# Patient Record
Sex: MALE | Race: WHITE | NOT HISPANIC OR LATINO | Employment: OTHER | URBAN - NONMETROPOLITAN AREA
[De-identification: names, ages, dates, MRNs, and addresses within clinical notes are randomized per-mention and may not be internally consistent; named-entity substitution may affect disease eponyms.]

---

## 2021-02-08 DIAGNOSIS — E78.5 HYPERLIPIDEMIA, UNSPECIFIED HYPERLIPIDEMIA TYPE: Primary | ICD-10-CM

## 2021-02-08 RX ORDER — EVOLOCUMAB 140 MG/ML
140 INJECTION, SOLUTION SUBCUTANEOUS
Qty: 6 ML | Refills: 1 | Status: SHIPPED | OUTPATIENT
Start: 2021-02-08 | End: 2022-07-21

## 2021-02-08 NOTE — TELEPHONE ENCOUNTER
Dr. Duke:     Last Office Visit: 12/09/2019  Last Labs: 10/21/2020  Next Lab Visit: -   Next Office Visit: -      :  Please book patient for a follow up appointment for labs and office visit.

## 2022-05-09 RX ORDER — PIOGLITAZONEHYDROCHLORIDE 45 MG/1
TABLET ORAL
Qty: 90 TABLET | Refills: 0 | Status: SHIPPED | OUTPATIENT
Start: 2022-05-09 | End: 2022-05-11 | Stop reason: SDUPTHER

## 2022-05-12 RX ORDER — PIOGLITAZONEHYDROCHLORIDE 45 MG/1
45 TABLET ORAL DAILY
Qty: 90 TABLET | Refills: 0 | Status: SHIPPED | OUTPATIENT
Start: 2022-05-12 | End: 2022-06-28

## 2022-05-20 RX ORDER — PIOGLITAZONEHYDROCHLORIDE 45 MG/1
TABLET ORAL
Qty: 90 TABLET | Refills: 0 | Status: SHIPPED | OUTPATIENT
Start: 2022-05-20 | End: 2022-06-28 | Stop reason: SDUPTHER

## 2022-06-03 RX ORDER — PIOGLITAZONEHYDROCHLORIDE 45 MG/1
TABLET ORAL
Qty: 90 TABLET | OUTPATIENT
Start: 2022-06-03

## 2022-06-28 ENCOUNTER — OFFICE VISIT (OUTPATIENT)
Dept: FAMILY MEDICINE CLINIC | Facility: CLINIC | Age: 77
End: 2022-06-28

## 2022-06-28 VITALS
BODY MASS INDEX: 34.37 KG/M2 | SYSTOLIC BLOOD PRESSURE: 132 MMHG | HEIGHT: 70 IN | DIASTOLIC BLOOD PRESSURE: 86 MMHG | OXYGEN SATURATION: 98 % | HEART RATE: 65 BPM | WEIGHT: 240.1 LBS

## 2022-06-28 DIAGNOSIS — E55.9 VITAMIN D DEFICIENCY: ICD-10-CM

## 2022-06-28 DIAGNOSIS — R80.9 TYPE 2 DIABETES MELLITUS WITH MICROALBUMINURIA, WITHOUT LONG-TERM CURRENT USE OF INSULIN: Primary | ICD-10-CM

## 2022-06-28 DIAGNOSIS — I10 HYPERTENSION, ESSENTIAL: ICD-10-CM

## 2022-06-28 DIAGNOSIS — E11.29 TYPE 2 DIABETES MELLITUS WITH MICROALBUMINURIA, WITHOUT LONG-TERM CURRENT USE OF INSULIN: Primary | ICD-10-CM

## 2022-06-28 DIAGNOSIS — E78.2 HYPERLIPIDEMIA, MIXED: ICD-10-CM

## 2022-06-28 PROCEDURE — 99214 OFFICE O/P EST MOD 30 MIN: CPT | Performed by: FAMILY MEDICINE

## 2022-06-28 PROCEDURE — 36415 COLL VENOUS BLD VENIPUNCTURE: CPT | Performed by: FAMILY MEDICINE

## 2022-06-28 RX ORDER — GLIPIZIDE 10 MG/1
10 TABLET ORAL
Qty: 180 TABLET | Refills: 1 | Status: SHIPPED | OUTPATIENT
Start: 2022-06-28 | End: 2022-11-01 | Stop reason: SDUPTHER

## 2022-06-28 RX ORDER — PIOGLITAZONEHYDROCHLORIDE 45 MG/1
45 TABLET ORAL DAILY
Qty: 90 TABLET | Refills: 1 | Status: SHIPPED | OUTPATIENT
Start: 2022-06-28 | End: 2022-11-01 | Stop reason: SDUPTHER

## 2022-06-28 RX ORDER — LOSARTAN POTASSIUM AND HYDROCHLOROTHIAZIDE 12.5; 1 MG/1; MG/1
1 TABLET ORAL DAILY
Qty: 90 TABLET | Refills: 1 | Status: SHIPPED | OUTPATIENT
Start: 2022-06-28 | End: 2022-11-01

## 2022-06-28 RX ORDER — AMLODIPINE BESYLATE 5 MG/1
5 TABLET ORAL DAILY
Qty: 90 TABLET | Refills: 1 | Status: SHIPPED | OUTPATIENT
Start: 2022-06-28 | End: 2022-11-01

## 2022-06-28 NOTE — PROGRESS NOTES
"Chief Complaint  Diabetes and Hypertension    Subjective    History of Present Illness:  Paul Shook is a 76 y.o. male who presents today for follow-up visit medication refills.  He has been doing well since his last visit together.  He has stopped both of Proscar and Flomax per urology after UroLift procedure.        [employer] Chart Review:  He does continue to follow with Urology given history of PSA elevation. They continue to monitor his PSA for now and consider biopsies only if his PSA goes higher into the 9-10 range.Past-due for followup colonoscopy given polyp on his 2012 colonoscopy. Urine microalb pos 10/19 -continues ARB given cough w/ ACE.  Eye exam uptodate with Dr Valdivia.     Stopped pravachol, atorvastatin, crestor and livalo given myalgias and muscle aches. On RepathaFollowup ongoing with Dr. Jade for CKD. [id number] 10/2020.  Completed covid19 series in 2021 Declines other vaccination update. Declines flu    Objective   Vital Signs:   /86 (BP Location: Left arm, Patient Position: Sitting, Cuff Size: Large Adult)   Pulse 65   Ht 177.8 cm (70\")   Wt 109 kg (240 lb 1.6 oz)   SpO2 98%   BMI 34.45 kg/m²     Review of Systems   Constitutional: Negative for appetite change, chills and fever.   HENT: Negative for hearing loss.    Eyes: Negative for blurred vision.   Respiratory: Negative for chest tightness.    Cardiovascular: Negative for chest pain.   Gastrointestinal: Negative for abdominal pain.   Musculoskeletal: Positive for arthralgias. Negative for gait problem and joint swelling.   Skin: Negative for rash.   Psychiatric/Behavioral: Negative for depressed mood.       Past History:  Medical History: has a past medical history of Acute maxillary sinusitis, Anemia, Anemia, chronic disease, Anxiety, Appendicitis, Benign essential hypertension, Blind left eye, Blood chemistry abnormality, BMI 34.0-34.9,adult, Cancer of skin of external cheek, Chronic kidney disease, stage 3a (HCC), Colon " polyps, Elevated lipids, Elevated PSA, Eye injury, Knee injury, Mixed hyperlipidemia, Renal disease, Type 2 diabetes mellitus (HCC), and Vitamin D deficiency.   Surgical History: has a past surgical history that includes Skin graft (Right, 2011); Knee surgery (Left); Appendectomy; Esophagogastroduodenoscopy (12/12/2012); and Colonoscopy (12/12/2012).   Family History: family history includes Cancer in his brother; Diabetes in his maternal grandfather and maternal grandmother; Diabetes type II in his mother; Heart disease in an other family member; Hypertension in an other family member; Lung cancer in his sister.   Social History: reports that he has never smoked. He has never used smokeless tobacco. He reports previous alcohol use. He reports that he does not use drugs.      Current Outpatient Medications:   •  amLODIPine (NORVASC) 5 MG tablet, Take 1 tablet by mouth Daily., Disp: 90 tablet, Rfl: 1  •  aspirin 81 MG EC tablet, Take 81 mg by mouth Daily., Disp: , Rfl:   •  Cholecalciferol 100 MCG (4000 UT) capsule, Take 1 tablet by mouth Daily., Disp: , Rfl:   •  Diclofenac Sodium (VOLTAREN) 1 % gel gel, Apply 4 g topically to the appropriate area as directed 4 (Four) Times a Day., Disp: , Rfl:   •  Evolocumab (Repatha SureClick) solution auto-injector SureClick injection, Inject 1 mL under the skin into the appropriate area as directed Every 14 (Fourteen) Days., Disp: 6 mL, Rfl: 1  •  glipizide (GLUCOTROL) 10 MG tablet, Take 1 tablet by mouth 2 (Two) Times a Day Before Meals., Disp: 180 tablet, Rfl: 1  •  glucose blood test strip, 1 each by Other route Daily. Use as instructed  frestyle lite strips, Disp: , Rfl:   •  pioglitazone (ACTOS) 45 MG tablet, Take 1 tablet by mouth Daily., Disp: 90 tablet, Rfl: 1  •  vitamin B-12 (CYANOCOBALAMIN) 1000 MCG tablet, Take 1,000 mcg by mouth Daily., Disp: , Rfl:   •  losartan-hydrochlorothiazide (HYZAAR) 100-12.5 MG per tablet, Take 1 tablet by mouth Daily., Disp: 90 tablet,  Rfl: 1    Allergies: Lisinopril    Physical Exam  Constitutional:       Appearance: Normal appearance.   HENT:      Head: Normocephalic.      Right Ear: External ear normal.      Left Ear: External ear normal.      Nose: Nose normal.   Eyes:      Pupils: Pupils are equal, round, and reactive to light.      Comments: Chronic right eye corneal cloudiness.   Cardiovascular:      Rate and Rhythm: Normal rate and regular rhythm.      Heart sounds: Normal heart sounds.   Pulmonary:      Effort: Pulmonary effort is normal.      Breath sounds: Normal breath sounds.   Musculoskeletal:         General: Normal range of motion.      Cervical back: Normal range of motion.      Right lower leg: No edema.      Left lower leg: No edema.   Skin:     General: Skin is warm and dry.   Neurological:      General: No focal deficit present.      Mental Status: He is alert.   Psychiatric:         Mood and Affect: Mood normal.         Behavior: Behavior normal.         Thought Content: Thought content normal.          Result Review                   Assessment and Plan  Diagnoses and all orders for this visit:    1. Type 2 diabetes mellitus with microalbuminuria, without long-term current use of insulin ([facility]) (Primary)  Assessment & Plan:  Diabetes is improving with treatment.   Continue current treatment regimen.  Diabetes will be reassessed 4 months.    Orders:  -     glipizide (GLUCOTROL) 10 MG tablet; Take 1 tablet by mouth 2 (Two) Times a Day Before Meals.  Dispense: 180 tablet; Refill: 1  -     pioglitazone (ACTOS) 45 MG tablet; Take 1 tablet by mouth Daily.  Dispense: 90 tablet; Refill: 1  -     Comprehensive Metabolic Panel; Future  -     Lipid Panel; Future  -     Hemoglobin A1c; Future  -     Comprehensive Metabolic Panel  -     Lipid Panel  -     Hemoglobin A1c    2. Hypertension, essential  Assessment & Plan:  Hypertension is improving with treatment.  Continue current treatment regimen.  Blood pressure will be reassessed at the  next regular appointment.    Orders:  -     amLODIPine (NORVASC) 5 MG tablet; Take 1 tablet by mouth Daily.  Dispense: 90 tablet; Refill: 1  -     losartan-hydrochlorothiazide (HYZAAR) 100-12.5 MG per tablet; Take 1 tablet by mouth Daily.  Dispense: 90 tablet; Refill: 1    3. Hyperlipidemia, mixed  Assessment & Plan:  Lipid abnormalities are improving with treatment.  Pharmacotherapy as ordered.  Lipids will be reassessed In 4 months.      4. Vitamin D deficiency  Assessment & Plan:  Continue vitamin D    Orders:  -     Vitamin D 25 Hydroxy; Future  -     Vitamin D 25 Hydroxy      BMI is >= 30 and <35. (Class 1 Obesity). The following options were offered after discussion;: exercise counseling/recommendations and nutrition counseling/recommendations          Follow Up  Return in about 4 months (around 10/28/2022) [facility].    Patient was given instructions and counseling regarding his condition or for health maintenance advice. Please see specific information pulled into the AVS if appropriate.     Dwayne Silva MD

## 2022-06-28 NOTE — ASSESSMENT & PLAN NOTE
Diabetes is improving with treatment.   Continue current treatment regimen.  Diabetes will be reassessed 4 months.

## 2022-06-28 NOTE — ASSESSMENT & PLAN NOTE
Patient: Lawson Tineo Date: 2020   : 1965 Attending: Placido Carney MD   55 Lipid abnormalities are improving with treatment.  Pharmacotherapy as ordered.  Lipids will be reassessed In 4 months.                       Alem Velásquez MD, SHAR[facility] RODRIGUEZ

## 2022-06-29 LAB
ALBUMIN SERPL-MCNC: 4.2 G/DL (ref 3.7–4.7)
ALBUMIN/GLOB SERPL: 1.2 {RATIO} (ref 1.2–2.2)
ALP SERPL-CCNC: 69 IU/L (ref 44–121)
ALT SERPL-CCNC: 11 IU/L (ref 0–44)
AST SERPL-CCNC: 16 IU/L (ref 0–40)
BILIRUB SERPL-MCNC: 0.3 MG/DL (ref 0–1.2)
BUN SERPL-MCNC: 33 MG/DL (ref 8–27)
BUN/CREAT SERPL: 19 (ref 10–24)
CALCIUM SERPL-MCNC: 9.1 MG/DL (ref 8.6–10.2)
CHLORIDE SERPL-SCNC: 100 MMOL/L (ref 96–106)
CHOLEST SERPL-MCNC: 102 MG/DL (ref 100–199)
CO2 SERPL-SCNC: 20 MMOL/L (ref 20–29)
CREAT SERPL-MCNC: 1.75 MG/DL (ref 0.76–1.27)
EGFRCR SERPLBLD CKD-EPI 2021: 40 ML/MIN/1.73
GLOBULIN SER CALC-MCNC: 3.4 G/DL (ref 1.5–4.5)
GLUCOSE SERPL-MCNC: 144 MG/DL (ref 65–99)
HBA1C MFR BLD: 7.1 % (ref 4.8–5.6)
HDLC SERPL-MCNC: 27 MG/DL
LDLC SERPL CALC-MCNC: 43 MG/DL (ref 0–99)
POTASSIUM SERPL-SCNC: 4.7 MMOL/L (ref 3.5–5.2)
PROT SERPL-MCNC: 7.6 G/DL (ref 6–8.5)
SODIUM SERPL-SCNC: 139 MMOL/L (ref 134–144)
TRIGL SERPL-MCNC: 195 MG/DL (ref 0–149)
VLDLC SERPL CALC-MCNC: 32 MG/DL (ref 5–40)

## 2022-06-30 LAB — 25(OH)D3+25(OH)D2 SERPL-MCNC: 50.1 NG/ML (ref 30–100)

## 2022-07-21 DIAGNOSIS — E78.5 HYPERLIPIDEMIA, UNSPECIFIED HYPERLIPIDEMIA TYPE: ICD-10-CM

## 2022-07-21 RX ORDER — EVOLOCUMAB 140 MG/ML
INJECTION, SOLUTION SUBCUTANEOUS
Qty: 2 ML | Refills: 2 | Status: SHIPPED | OUTPATIENT
Start: 2022-07-21 | End: 2022-09-21 | Stop reason: SDUPTHER

## 2022-09-14 ENCOUNTER — PATIENT MESSAGE (OUTPATIENT)
Dept: FAMILY MEDICINE CLINIC | Facility: CLINIC | Age: 77
End: 2022-09-14

## 2022-09-14 NOTE — TELEPHONE ENCOUNTER
From: Paul Shook  To: Dwayne Silva MD  Sent: 9/14/2022 11:56 AM EDT  Subject: Repatha shot    I tried to inject the shot on 9/13/2022 after being out of refrigerator for 1/2 hour. I tried several times and it would not click for the medicine release. I have call the pharmacy and was told to call Repatha help line..   I was told to call his doctor and ask if it would matter if he missed the shot or if you had a sample you could give me. Help desk said if I didn't get a shot before 7 days to go back to regular schedule.  Let me know if this is correct.  Thank you

## 2022-09-21 DIAGNOSIS — E78.5 HYPERLIPIDEMIA, UNSPECIFIED HYPERLIPIDEMIA TYPE: ICD-10-CM

## 2022-09-21 RX ORDER — EVOLOCUMAB 140 MG/ML
140 INJECTION, SOLUTION SUBCUTANEOUS
Qty: 2 ML | Refills: 2 | Status: SHIPPED | OUTPATIENT
Start: 2022-09-21 | End: 2022-11-01 | Stop reason: SDUPTHER

## 2022-09-21 NOTE — TELEPHONE ENCOUNTER
Caller: [facility] [place] - [address] - [phone]  - [phone] FX    Relationship: Pharmacy    Best call back number: [phone]    Requested Prescriptions:   Requested Prescriptions     Pending Prescriptions Disp Refills   • Evolocumab (Repatha SureClick) solution auto-injector SureClick injection 2 mL 2        Pharmacy where request should be sent: [facility] [place] - [address] - [phone]  - [phone] FX     Additional details provided by patient: [facility] NEEDS AUTHORIZATION AND A PRESCRIPTION FOR 1 PEN AS HE HAD A MISFIRE. PLEASE ADVISE      Does the patient have less than a 3 day supply:  [x] Yes  [] No    Syed Minor Rep   09/21/22 09:15 EDT

## 2022-11-01 ENCOUNTER — OFFICE VISIT (OUTPATIENT)
Dept: FAMILY MEDICINE CLINIC | Facility: CLINIC | Age: 77
End: 2022-11-01

## 2022-11-01 VITALS
WEIGHT: 241 LBS | DIASTOLIC BLOOD PRESSURE: 80 MMHG | SYSTOLIC BLOOD PRESSURE: 140 MMHG | BODY MASS INDEX: 34.5 KG/M2 | HEIGHT: 70 IN | HEART RATE: 70 BPM | OXYGEN SATURATION: 99 %

## 2022-11-01 DIAGNOSIS — E55.9 VITAMIN D DEFICIENCY: Chronic | ICD-10-CM

## 2022-11-01 DIAGNOSIS — E53.8 VITAMIN B12 DEFICIENCY: ICD-10-CM

## 2022-11-01 DIAGNOSIS — E78.2 HYPERLIPIDEMIA, MIXED: Chronic | ICD-10-CM

## 2022-11-01 DIAGNOSIS — R80.9 TYPE 2 DIABETES MELLITUS WITH MICROALBUMINURIA, WITHOUT LONG-TERM CURRENT USE OF INSULIN: Chronic | ICD-10-CM

## 2022-11-01 DIAGNOSIS — E11.22 TYPE 2 DIABETES MELLITUS WITH STAGE 3B CHRONIC KIDNEY DISEASE, WITHOUT LONG-TERM CURRENT USE OF INSULIN: ICD-10-CM

## 2022-11-01 DIAGNOSIS — E11.29 TYPE 2 DIABETES MELLITUS WITH MICROALBUMINURIA, WITHOUT LONG-TERM CURRENT USE OF INSULIN: Chronic | ICD-10-CM

## 2022-11-01 DIAGNOSIS — E61.1 IRON DEFICIENCY: ICD-10-CM

## 2022-11-01 DIAGNOSIS — I10 HYPERTENSION, ESSENTIAL: Chronic | ICD-10-CM

## 2022-11-01 DIAGNOSIS — N18.32 TYPE 2 DIABETES MELLITUS WITH STAGE 3B CHRONIC KIDNEY DISEASE, WITHOUT LONG-TERM CURRENT USE OF INSULIN: ICD-10-CM

## 2022-11-01 DIAGNOSIS — Z00.00 GENERAL MEDICAL EXAM: Primary | ICD-10-CM

## 2022-11-01 PROCEDURE — 96160 PT-FOCUSED HLTH RISK ASSMT: CPT | Performed by: FAMILY MEDICINE

## 2022-11-01 PROCEDURE — 36415 COLL VENOUS BLD VENIPUNCTURE: CPT | Performed by: FAMILY MEDICINE

## 2022-11-01 PROCEDURE — 1170F FXNL STATUS ASSESSED: CPT | Performed by: FAMILY MEDICINE

## 2022-11-01 PROCEDURE — G0439 PPPS, SUBSEQ VISIT: HCPCS | Performed by: FAMILY MEDICINE

## 2022-11-01 PROCEDURE — 1159F MED LIST DOCD IN RCRD: CPT | Performed by: FAMILY MEDICINE

## 2022-11-01 PROCEDURE — 99213 OFFICE O/P EST LOW 20 MIN: CPT | Performed by: FAMILY MEDICINE

## 2022-11-01 RX ORDER — PIOGLITAZONEHYDROCHLORIDE 45 MG/1
45 TABLET ORAL DAILY
Qty: 90 TABLET | Refills: 1 | Status: SHIPPED | OUTPATIENT
Start: 2022-11-01 | End: 2023-03-01 | Stop reason: SDUPTHER

## 2022-11-01 RX ORDER — AMLODIPINE AND VALSARTAN 5; 320 MG/1; MG/1
1 TABLET ORAL DAILY
Qty: 90 TABLET | Refills: 1 | Status: SHIPPED | OUTPATIENT
Start: 2022-11-01 | End: 2023-03-01 | Stop reason: SDUPTHER

## 2022-11-01 RX ORDER — GLIPIZIDE 10 MG/1
10 TABLET ORAL
Qty: 180 TABLET | Refills: 1 | Status: SHIPPED | OUTPATIENT
Start: 2022-11-01 | End: 2023-03-01 | Stop reason: SDUPTHER

## 2022-11-01 RX ORDER — ASPIRIN 81 MG/1
81 TABLET ORAL DAILY
Qty: 90 TABLET | Refills: 1 | Status: SHIPPED | OUTPATIENT
Start: 2022-11-01 | End: 2023-03-01 | Stop reason: SDUPTHER

## 2022-11-01 RX ORDER — EVOLOCUMAB 140 MG/ML
140 INJECTION, SOLUTION SUBCUTANEOUS
Qty: 2 ML | Refills: 5 | Status: SHIPPED | OUTPATIENT
Start: 2022-11-01 | End: 2023-03-01 | Stop reason: SDUPTHER

## 2022-11-01 NOTE — ASSESSMENT & PLAN NOTE
Reviewed together health maintenance, screening options, and vaccination recommendations at his annual wellness visit today.    Declines hep C screening.    Declines vaccination update.    Unable to give urine sample for microalbumin.    Diabetic eye exam scheduled for December.    Encouraged advance directive.    Discussed blood pressure elevation and we will change from amlodipine with losartan HCTZ to Exforge.  He will continue to monitor blood pressure readings at home and notify us if his blood pressure is staying over 140/90.  Recheck at next visit in 3 to 4 months.

## 2022-11-01 NOTE — PROGRESS NOTES
QUICK REFERENCE INFORMATION:  The ABCs of the Annual Wellness Visit    Subsequent Medicare Wellness Visit    @[email or web address]@    HEALTH RISK ASSESSMENT    1945    Recent Hospitalizations:  No hospitalization(s) within the last year..        Current Medical Providers:  Patient Care Team:  Dwayne Silva MD as PCP - General (Family Medicine)        Smoking Status:  Social History     Tobacco Use   Smoking Status Never   Smokeless Tobacco Never       Alcohol Consumption:  Social History     Substance and Sexual Activity   Alcohol Use Not Currently       Depression Screen:   PHQ-2/PHQ-9 Depression Screening 11/1/2022   Little Interest or Pleasure in Doing Things 0-->not at all   Feeling Down, Depressed or Hopeless 0-->not at all   PHQ-9: Brief Depression Severity Measure Score 0       Health Habits and Functional and Cognitive Screening:  Functional & Cognitive Status 11/1/2022   Do you have difficulty preparing food and eating? No   Do you have difficulty bathing yourself, getting dressed or grooming yourself? No   Do you have difficulty using the toilet? No   Do you have difficulty moving around from place to place? Yes   Do you have trouble with steps or getting out of a bed or a chair? Yes   Current Diet Well Balanced Diet   Dental Exam Up to date   Eye Exam Up to date   Exercise (times per week) 0 times per week   Current Exercises Include No Regular Exercise   Do you need help using the phone?  No   Are you deaf or do you have serious difficulty hearing?  No   Do you need help with transportation? No   Do you need help shopping? No   Do you need help preparing meals?  No   Do you need help with housework?  No   Do you need help with laundry? No   Do you need help taking your medications? No   Do you need help managing money? No   Do you ever drive or ride in a car without wearing a seat belt? No   Have you felt unusual stress, anger or loneliness in the last month? No   Who do you live with? Sibling   If you  need help, do you have trouble finding someone available to you? No   Have you been bothered in the last four weeks by sexual problems? No   Do you have difficulty concentrating, remembering or making decisions? No       Fall Risk Screen:  ANUJ Fall Risk Assessment was completed, and patient is at MODERATE risk for falls. Assessment completed on:11/1/2022    ACE III MINI        Does the patient have evidence of cognitive impairment? No    Aspirin use counseling: Taking ASA appropriately as indicated    Recent Lab Results:  CMP:  Lab Results   Component Value Date    BUN 33 (H) 06/28/2022    CREATININE 1.75 (H) 06/28/2022    BCR 19 06/28/2022     06/28/2022    K 4.7 06/28/2022    CO2 20 06/28/2022    CALCIUM 9.1 06/28/2022    PROTENTOTREF 7.6 06/28/2022    ALBUMIN 4.2 06/28/2022    LABGLOBREF 3.4 06/28/2022    LABIL2 1.2 06/28/2022    BILITOT 0.3 06/28/2022    ALKPHOS 69 06/28/2022    AST 16 06/28/2022    ALT 11 06/28/2022     HbA1c:  Lab Results   Component Value Date    HGBA1C 7.1 (H) 06/28/2022     Microalbumin:  No results found for: MICROALBUR, POCMALB, POCCREAT  Lipid Panel  Lab Results   Component Value Date    TRIG 195 (H) 06/28/2022    HDL 27 (L) 06/28/2022    LDL 43 06/28/2022    AST 16 06/28/2022    ALT 11 06/28/2022       Visual Acuity:  No results found.    Age-appropriate Screening Schedule:  Refer to the list below for future screening recommendations based on patient's age, sex and/or medical conditions. Orders for these recommended tests are listed in the plan section. The patient has been provided with a written plan.    Health Maintenance   Topic Date Due   • URINE MICROALBUMIN  11/19/2022 (Originally 1945)   • DIABETIC EYE EXAM  12/20/2022 (Originally 2/8/2021)   • INFLUENZA VACCINE  03/31/2023 (Originally 8/1/2022)   • TDAP/TD VACCINES (1 - Tdap) 11/01/2023 (Originally 7/1/1964)   • HEMOGLOBIN A1C  12/28/2022   • LIPID PANEL  06/28/2023   • ZOSTER VACCINE  Discontinued         Subjective   History of Present Illness    Paul Shook is a 77 y.o. male who presents for a Subsequent Wellness Visit and physical exam.    He has been doing well since his last visit together.  His blood pressure remains above goal today.  He has been checking it at home with his readings in the 140s over 80s.    We did try going to 10 mg with amlodipine but he had a lot of swelling difficulties.  He is willing to make a change from his amlodipine and losartan HCTZ another medication.    He does continue to follow with Urology given history of PSA elevation. They continue to monitor his PSA for now and consider biopsies only if his PSA goes higher into the 9-10 range.    Past-due for followup colonoscopy given polyp on his 2012 colonoscopy.  He does not want a follow-up colonoscopy at this time or Cologuard.    Urine microalb pos 10/19 -continues ARB given cough w/ ACE.   Unable to give urine sample for microalbumin today.    Eye exam uptodate with Dr Valdivia.  Next appointment is December 2022.     Stopped pravachol, atorvastatin, crestor and livalo given myalgias and muscle aches. On Repatha    Followup ongoing with Dr. Jade for CKD.     We discussed vaccination update today including influenza, Shingrix, Tdap, COVID booster.  He voiced understanding but declines vaccination update.    Declines hep C screening.    Encourage consideration for advanced directive.    He does have a history of anemia related to his chronic kidney disease with hemoglobin in the 9-10 range.  He is on an over-the-counter iron supplement along with B12.  No bleeding problems       CHRONIC CONDITIONS    The following portions of the patient's history were reviewed and updated as appropriate: allergies, current medications, past family history, past medical history, past social history, past surgical history and problem list.    Outpatient Medications Prior to Visit   Medication Sig Dispense Refill   • Cholecalciferol 100 MCG  (4000 UT) capsule Take 1 tablet by mouth Daily.     • Diclofenac Sodium (VOLTAREN) 1 % gel gel Apply 4 g topically to the appropriate area as directed 4 (Four) Times a Day.     • glucose blood test strip 1 each by Other route Daily. Use as instructed    frestyle lite strips     • vitamin B-12 (CYANOCOBALAMIN) 1000 MCG tablet Take 1,000 mcg by mouth Daily.     • amLODIPine (NORVASC) 5 MG tablet Take 1 tablet by mouth Daily. 90 tablet 1   • aspirin 81 MG EC tablet Take 81 mg by mouth Daily.     • Evolocumab (Repatha SureClick) solution auto-injector SureClick injection Inject 1 mL under the skin into the appropriate area as directed Every 14 (Fourteen) Days. 2 mL 2   • glipizide (GLUCOTROL) 10 MG tablet Take 1 tablet by mouth 2 (Two) Times a Day Before Meals. 180 tablet 1   • losartan-hydrochlorothiazide (HYZAAR) 100-12.5 MG per tablet Take 1 tablet by mouth Daily. 90 tablet 1   • pioglitazone (ACTOS) 45 MG tablet Take 1 tablet by mouth Daily. 90 tablet 1     No facility-administered medications prior to visit.       Patient Active Problem List   Diagnosis   • Type 2 diabetes mellitus with microalbuminuria, without long-term current use of insulin (HCC)   • Hypertension, essential   • Hyperlipidemia, mixed   • Vitamin D deficiency   • Type 2 diabetes mellitus with stage 3b chronic kidney disease, without long-term current use of insulin (HCC)   • Vitamin B12 deficiency   • Iron deficiency   • General medical exam       Advance Care Planning:  ACP discussion was held with the patient during this visit. Patient does not have an advance directive, information provided.    Identification of Risk Factors:  Risk factors include: Advance Directive Discussion  Cardiovascular risk  Fall Risk  Immunizations Discussed/Encouraged (specific immunizations; Tdap, Influenza, Prevnar 20 (Pneumococcal 20-valent conjugate), Shingrix and COVID19 ).    Review of Systems   Constitutional: Negative for appetite change, chills, fever and  "unexpected weight change.   HENT: Negative for hearing loss.    Eyes: Negative for visual disturbance.   Respiratory: Negative for chest tightness, shortness of breath and wheezing.    Cardiovascular: Negative for chest pain, palpitations and leg swelling.   Gastrointestinal: Negative for abdominal pain.   Musculoskeletal: Positive for arthralgias, back pain and gait problem.        Using cane for support. No falls   Skin: Negative for rash.   Neurological: Negative for dizziness and headaches.   Psychiatric/Behavioral: Negative for agitation and confusion. The patient is not nervous/anxious.        Compared to one year ago, the patient feels his physical health is the same.  Compared to one year ago, the patient feels his mental health is the same.    Objective     Physical Exam  Constitutional:       Appearance: He is obese.   HENT:      Head: Normocephalic.      Right Ear: External ear normal.      Left Ear: External ear normal.      Nose: Nose normal.   Eyes:      Pupils: Pupils are equal, round, and reactive to light.   Cardiovascular:      Rate and Rhythm: Normal rate and regular rhythm.      Heart sounds: Normal heart sounds.   Pulmonary:      Effort: Pulmonary effort is normal.      Breath sounds: Normal breath sounds.   Musculoskeletal:      Cervical back: Normal range of motion.      Comments: Using cane for ambulatory support   Skin:     General: Skin is warm and dry.   Neurological:      General: No focal deficit present.      Mental Status: He is alert.   Psychiatric:         Mood and Affect: Mood normal.         Behavior: Behavior normal.         Thought Content: Thought content normal.          Procedures     Vitals:    11/01/22 0748   BP: 140/80   BP Location: Left arm   Patient Position: Sitting   Cuff Size: Adult   Pulse: 70   SpO2: 99%   Weight: 109 kg (241 lb)   Height: 177.8 cm (70\")       BMI is >= 30 and <35. (Class 1 Obesity). The following options were offered after discussion;: exercise " counseling/recommendations and nutrition counseling/recommendations      No results found for: WBC, HGB, HCT, MCV, PLT  Lab Results   Component Value Date    GLUCOSE 144 (H) 06/28/2022    BUN 33 (H) 06/28/2022    CREATININE 1.75 (H) 06/28/2022    BCR 19 06/28/2022    K 4.7 06/28/2022    CO2 20 06/28/2022    CALCIUM 9.1 06/28/2022    PROTENTOTREF 7.6 06/28/2022    ALBUMIN 4.2 06/28/2022    LABIL2 1.2 06/28/2022    AST 16 06/28/2022    ALT 11 06/28/2022     No results found for: TSH  No results found for: PSA  Lab Results   Component Value Date    CHLPL 102 06/28/2022    TRIG 195 (H) 06/28/2022    HDL 27 (L) 06/28/2022    LDL 43 06/28/2022       Assessment & Plan   Problem List Items Addressed This Visit        Cardiac and Vasculature    Hypertension, essential (Chronic)    Current Assessment & Plan     See above.  Change to Exforge.         Relevant Medications    amLODIPine-valsartan (Exforge) 5-320 MG per tablet    Other Relevant Orders    CBC Auto Differential    Comprehensive Metabolic Panel    Lipid Panel    TSH    T4, Free    Hyperlipidemia, mixed (Chronic)    Current Assessment & Plan     Lipid abnormalities are improving with treatment.  Pharmacotherapy as ordered.  Lipids will be reassessed in 3 months.         Relevant Medications    Evolocumab (Repatha SureClick) solution auto-injector SureClick injection    Other Relevant Orders    CBC Auto Differential    Comprehensive Metabolic Panel    Lipid Panel    TSH    T4, Free       Endocrine and Metabolic    Type 2 diabetes mellitus with microalbuminuria, without long-term current use of insulin (HCC) (Chronic)    Current Assessment & Plan     Diabetes is improving with treatment.   Continue current treatment regimen.  Diabetes will be reassessed in 3 months.         Relevant Medications    amLODIPine-valsartan (Exforge) 5-320 MG per tablet    aspirin 81 MG EC tablet    Evolocumab (Repatha SureClick) solution auto-injector SureClick injection    glipizide  (GLUCOTROL) 10 MG tablet    pioglitazone (ACTOS) 45 MG tablet    Other Relevant Orders    CBC Auto Differential    Comprehensive Metabolic Panel    Lipid Panel    Hemoglobin A1c    TSH    T4, Free    Vitamin B12    Vitamin D deficiency (Chronic)    Current Assessment & Plan     Reviewed normal vitamin D with last labs.  Continues with over-the-counter vitamin D supplement         Vitamin B12 deficiency (Chronic)    Relevant Orders    Vitamin B12    Folate       Health Encounters    General medical exam - Primary    Current Assessment & Plan     Reviewed together health maintenance, screening options, and vaccination recommendations at his annual wellness visit today.    Declines hep C screening.    Declines vaccination update.    Unable to give urine sample for microalbumin.    Diabetic eye exam scheduled for December.    Encouraged advance directive.    Discussed blood pressure elevation and we will change from amlodipine with losartan HCTZ to Exforge.  He will continue to monitor blood pressure readings at home and notify us if his blood pressure is staying over 140/90.  Recheck at next visit in 3 to 4 months.            Hematology and Neoplasia    Iron deficiency    Current Assessment & Plan     On iron over-the-counter.    Will check CBC with iron studies.         Relevant Orders    Ferritin    Iron Profile       Other    Type 2 diabetes mellitus with stage 3b chronic kidney disease, without long-term current use of insulin (HCC) (Chronic)    Current Assessment & Plan     Renal condition is improving with treatment.  Continue current treatment regimen.  Renal condition will be reassessed in 3 months.         Relevant Medications    amLODIPine-valsartan (Exforge) 5-320 MG per tablet    aspirin 81 MG EC tablet    Evolocumab (Repatha SureClick) solution auto-injector SureClick injection    glipizide (GLUCOTROL) 10 MG tablet    pioglitazone (ACTOS) 45 MG tablet    Other Relevant Orders    CBC Auto Differential     Comprehensive Metabolic Panel    Lipid Panel    Hemoglobin A1c    TSH    T4, Free     Patient Self-Management and Personalized Health Advice  The patient has been provided with information about: diet and exercise and preventive services including:   · Annual Wellness Visit (AWV)  · Influenza Vaccine and Administration  · Pneumococcal Vaccine and Administration.    Outpatient Encounter Medications as of 11/1/2022   Medication Sig Dispense Refill   • aspirin 81 MG EC tablet Take 1 tablet by mouth Daily. 90 tablet 1   • Cholecalciferol 100 MCG (4000 UT) capsule Take 1 tablet by mouth Daily.     • Diclofenac Sodium (VOLTAREN) 1 % gel gel Apply 4 g topically to the appropriate area as directed 4 (Four) Times a Day.     • Evolocumab (Repatha SureClick) solution auto-injector SureClick injection Inject 1 mL under the skin into the appropriate area as directed Every 14 (Fourteen) Days. 2 mL 5   • glipizide (GLUCOTROL) 10 MG tablet Take 1 tablet by mouth 2 (Two) Times a Day Before Meals. 180 tablet 1   • glucose blood test strip 1 each by Other route Daily. Use as instructed    frestyle lite strips     • pioglitazone (ACTOS) 45 MG tablet Take 1 tablet by mouth Daily. 90 tablet 1   • vitamin B-12 (CYANOCOBALAMIN) 1000 MCG tablet Take 1,000 mcg by mouth Daily.     • [DISCONTINUED] amLODIPine (NORVASC) 5 MG tablet Take 1 tablet by mouth Daily. 90 tablet 1   • [DISCONTINUED] aspirin 81 MG EC tablet Take 81 mg by mouth Daily.     • [DISCONTINUED] Evolocumab (Repatha SureClick) solution auto-injector SureClick injection Inject 1 mL under the skin into the appropriate area as directed Every 14 (Fourteen) Days. 2 mL 2   • [DISCONTINUED] glipizide (GLUCOTROL) 10 MG tablet Take 1 tablet by mouth 2 (Two) Times a Day Before Meals. 180 tablet 1   • [DISCONTINUED] losartan-hydrochlorothiazide (HYZAAR) 100-12.5 MG per tablet Take 1 tablet by mouth Daily. 90 tablet 1   • [DISCONTINUED] pioglitazone (ACTOS) 45 MG tablet Take 1 tablet by  mouth Daily. 90 tablet 1   • amLODIPine-valsartan (Exforge) 5-320 MG per tablet Take 1 tablet by mouth Daily. (replaces amlodipine AND LosartanHCTZ) 90 tablet 1     No facility-administered encounter medications on file as of 11/1/2022.       Reviewed use of high risk medication in the elderly: yes  Reviewed for potential of harmful drug interactions in the elderly: yes    Diagnoses and all orders for this visit:    1. General medical exam (Primary)  Assessment & Plan:  Reviewed together health maintenance, screening options, and vaccination recommendations at his annual wellness visit today.    Declines hep C screening.    Declines vaccination update.    Unable to give urine sample for microalbumin.    Diabetic eye exam scheduled for December.    Encouraged advance directive.    Discussed blood pressure elevation and we will change from amlodipine with losartan HCTZ to Exforge.  He will continue to monitor blood pressure readings at home and notify us if his blood pressure is staying over 140/90.  Recheck at next visit in 3 to 4 months.      2. Type 2 diabetes mellitus with microalbuminuria, without long-term current use of insulin ([facility])  Assessment & Plan:  Diabetes is improving with treatment.   Continue current treatment regimen.  Diabetes will be reassessed in 3 months.    Orders:  -     amLODIPine-valsartan (Exforge) 5-320 MG per tablet; Take 1 tablet by mouth Daily. (replaces amlodipine AND LosartanHCTZ)  Dispense: 90 tablet; Refill: 1  -     aspirin 81 MG EC tablet; Take 1 tablet by mouth Daily.  Dispense: 90 tablet; Refill: 1  -     Evolocumab (Repatha SureClick) solution auto-injector SureClick injection; Inject 1 mL under the skin into the appropriate area as directed Every 14 (Fourteen) Days.  Dispense: 2 mL; Refill: 5  -     glipizide (GLUCOTROL) 10 MG tablet; Take 1 tablet by mouth 2 (Two) Times a Day Before Meals.  Dispense: 180 tablet; Refill: 1  -     pioglitazone (ACTOS) 45 MG tablet; Take 1 tablet  by mouth Daily.  Dispense: 90 tablet; Refill: 1  -     CBC Auto Differential; Future  -     Comprehensive Metabolic Panel; Future  -     Lipid Panel; Future  -     Hemoglobin A1c; Future  -     TSH; Future  -     T4, Free; Future  -     Vitamin B12; Future    3. Hypertension, essential  Assessment & Plan:  See above.  Change to Exforge.    Orders:  -     amLODIPine-valsartan (Exforge) 5-320 MG per tablet; Take 1 tablet by mouth Daily. (replaces amlodipine AND LosartanHCTZ)  Dispense: 90 tablet; Refill: 1  -     CBC Auto Differential; Future  -     Comprehensive Metabolic Panel; Future  -     Lipid Panel; Future  -     TSH; Future  -     T4, Free; Future    4. Hyperlipidemia, mixed  Assessment & Plan:  Lipid abnormalities are improving with treatment.  Pharmacotherapy as ordered.  Lipids will be reassessed in 3 months.    Orders:  -     Evolocumab (Repatha SureClick) solution auto-injector SureClick injection; Inject 1 mL under the skin into the appropriate area as directed Every 14 (Fourteen) Days.  Dispense: 2 mL; Refill: 5  -     CBC Auto Differential; Future  -     Comprehensive Metabolic Panel; Future  -     Lipid Panel; Future  -     TSH; Future  -     T4, Free; Future    5. Vitamin D deficiency  Assessment & Plan:  Reviewed normal vitamin D with last labs.  Continues with over-the-counter vitamin D supplement      6. Type 2 diabetes mellitus with stage 3b chronic kidney disease, without long-term current use of insulin ([facility])  Assessment & Plan:  Renal condition is improving with treatment.  Continue current treatment regimen.  Renal condition will be reassessed in 3 months.    Orders:  -     amLODIPine-valsartan (Exforge) 5-320 MG per tablet; Take 1 tablet by mouth Daily. (replaces amlodipine AND LosartanHCTZ)  Dispense: 90 tablet; Refill: 1  -     aspirin 81 MG EC tablet; Take 1 tablet by mouth Daily.  Dispense: 90 tablet; Refill: 1  -     Evolocumab (Repatha SureClick) solution auto-injector SureClick  injection; Inject 1 mL under the skin into the appropriate area as directed Every 14 (Fourteen) Days.  Dispense: 2 mL; Refill: 5  -     glipizide (GLUCOTROL) 10 MG tablet; Take 1 tablet by mouth 2 (Two) Times a Day Before Meals.  Dispense: 180 tablet; Refill: 1  -     pioglitazone (ACTOS) 45 MG tablet; Take 1 tablet by mouth Daily.  Dispense: 90 tablet; Refill: 1  -     CBC Auto Differential; Future  -     Comprehensive Metabolic Panel; Future  -     Lipid Panel; Future  -     Hemoglobin A1c; Future  -     TSH; Future  -     T4, Free; Future    7. Vitamin B12 deficiency  -     Vitamin B12; Future  -     Folate; Future    8. Iron deficiency  Assessment & Plan:  On iron over-the-counter.    Will check CBC with iron studies.    Orders:  -     Ferritin; Future  -     Iron Profile; Future      Follow Up:  Return in about 4 months (around 3/1/2023).     There are no Patient Instructions on file for this visit.    An After Visit Summary and PPPS with all of these plans were given to the patient.

## 2022-11-02 LAB
ALBUMIN SERPL-MCNC: 4.4 G/DL (ref 3.7–4.7)
ALBUMIN/GLOB SERPL: 1.5 {RATIO} (ref 1.2–2.2)
ALP SERPL-CCNC: 76 IU/L (ref 44–121)
ALT SERPL-CCNC: 8 IU/L (ref 0–44)
AST SERPL-CCNC: 13 IU/L (ref 0–40)
BASOPHILS # BLD AUTO: 0 X10E3/UL (ref 0–0.2)
BASOPHILS NFR BLD AUTO: 1 %
BILIRUB SERPL-MCNC: 0.2 MG/DL (ref 0–1.2)
BUN SERPL-MCNC: 22 MG/DL (ref 8–27)
BUN/CREAT SERPL: 13 (ref 10–24)
CALCIUM SERPL-MCNC: 9.4 MG/DL (ref 8.6–10.2)
CHLORIDE SERPL-SCNC: 101 MMOL/L (ref 96–106)
CHOLEST SERPL-MCNC: 108 MG/DL (ref 100–199)
CO2 SERPL-SCNC: 26 MMOL/L (ref 20–29)
CREAT SERPL-MCNC: 1.64 MG/DL (ref 0.76–1.27)
EGFRCR SERPLBLD CKD-EPI 2021: 43 ML/MIN/1.73
EOSINOPHIL # BLD AUTO: 0.2 X10E3/UL (ref 0–0.4)
EOSINOPHIL NFR BLD AUTO: 3 %
ERYTHROCYTE [DISTWIDTH] IN BLOOD BY AUTOMATED COUNT: 14.3 % (ref 11.6–15.4)
FERRITIN SERPL-MCNC: 169 NG/ML (ref 30–400)
FOLATE SERPL-MCNC: 6.1 NG/ML
GLOBULIN SER CALC-MCNC: 2.9 G/DL (ref 1.5–4.5)
GLUCOSE SERPL-MCNC: 113 MG/DL (ref 70–99)
HBA1C MFR BLD: 6.6 % (ref 4.8–5.6)
HCT VFR BLD AUTO: 33 % (ref 37.5–51)
HDLC SERPL-MCNC: 27 MG/DL
HGB BLD-MCNC: 10.2 G/DL (ref 13–17.7)
IMM GRANULOCYTES # BLD AUTO: 0 X10E3/UL (ref 0–0.1)
IMM GRANULOCYTES NFR BLD AUTO: 0 %
IRON SATN MFR SERPL: 19 % (ref 15–55)
IRON SERPL-MCNC: 59 UG/DL (ref 38–169)
LDLC SERPL CALC-MCNC: 52 MG/DL (ref 0–99)
LYMPHOCYTES # BLD AUTO: 1.1 X10E3/UL (ref 0.7–3.1)
LYMPHOCYTES NFR BLD AUTO: 21 %
MCH RBC QN AUTO: 29.7 PG (ref 26.6–33)
MCHC RBC AUTO-ENTMCNC: 30.9 G/DL (ref 31.5–35.7)
MCV RBC AUTO: 96 FL (ref 79–97)
MONOCYTES # BLD AUTO: 0.4 X10E3/UL (ref 0.1–0.9)
MONOCYTES NFR BLD AUTO: 8 %
NEUTROPHILS # BLD AUTO: 3.5 X10E3/UL (ref 1.4–7)
NEUTROPHILS NFR BLD AUTO: 67 %
PLATELET # BLD AUTO: 224 X10E3/UL (ref 150–450)
POTASSIUM SERPL-SCNC: 4.4 MMOL/L (ref 3.5–5.2)
PROT SERPL-MCNC: 7.3 G/DL (ref 6–8.5)
RBC # BLD AUTO: 3.43 X10E6/UL (ref 4.14–5.8)
SODIUM SERPL-SCNC: 139 MMOL/L (ref 134–144)
T4 FREE SERPL-MCNC: 1.49 NG/DL (ref 0.82–1.77)
TIBC SERPL-MCNC: 306 UG/DL (ref 250–450)
TRIGL SERPL-MCNC: 168 MG/DL (ref 0–149)
TSH SERPL DL<=0.005 MIU/L-ACNC: 1.93 UIU/ML (ref 0.45–4.5)
UIBC SERPL-MCNC: 247 UG/DL (ref 111–343)
VIT B12 SERPL-MCNC: 1441 PG/ML (ref 232–1245)
VLDLC SERPL CALC-MCNC: 29 MG/DL (ref 5–40)
WBC # BLD AUTO: 5.2 X10E3/UL (ref 3.4–10.8)

## 2022-12-14 ENCOUNTER — TELEPHONE (OUTPATIENT)
Dept: FAMILY MEDICINE CLINIC | Facility: CLINIC | Age: 77
End: 2022-12-14

## 2022-12-14 NOTE — TELEPHONE ENCOUNTER
Caller: ARNOLD-SON    Best call back number:    [phone]       What was the call regarding:  PATIENT'S (SON) ARNOLD WAS INFORMED OF THE MESSAGE AND STATED THAT HE HAS PATIENT AT THE ER DEPARTMENT NOW AT [facility]   Dianna Rondon MAMedical AssistantSigned  [phone]                   Tried to call patient and no answer unable to leave message, if patient calls back please tell him to go to urgent care or ER     HUB CAN READ

## 2022-12-14 NOTE — TELEPHONE ENCOUNTER
Tried to call patient and no answer unable to leave message, if patient calls back please tell him to go to urgent care or ER    HUB CAN READ

## 2022-12-14 NOTE — TELEPHONE ENCOUNTER
Caller: ARNOLD-SON    Relationship to patient:     Best call back number: [phone]    Chief complaint: FEVER,CAN BARELY BREATH, THROAT SWELLED UP, DIZZY  Patient directed to call 911 or go to their nearest emergency room.     Patient verbalized understanding: [x] Yes  [] No  If no, why?

## 2022-12-15 ENCOUNTER — TELEPHONE (OUTPATIENT)
Dept: FAMILY MEDICINE CLINIC | Facility: CLINIC | Age: 77
End: 2022-12-15

## 2022-12-15 NOTE — TELEPHONE ENCOUNTER
Called patients son and unable to leave voicemail if he calls back. Tylenol and motrin for body aches and mucinex for decongestion.    HUB CAN READ.

## 2022-12-15 NOTE — TELEPHONE ENCOUNTER
PT SON WAS INFORMED HE HAD COVID WOULD LIKE TO KNOW [facility] TO GET HIM CALL HIM AT [phone]

## 2023-01-18 ENCOUNTER — TELEPHONE (OUTPATIENT)
Dept: FAMILY MEDICINE CLINIC | Facility: CLINIC | Age: 78
End: 2023-01-18

## 2023-01-23 ENCOUNTER — OFFICE VISIT (OUTPATIENT)
Dept: FAMILY MEDICINE CLINIC | Facility: CLINIC | Age: 78
End: 2023-01-23
Payer: MEDICARE

## 2023-01-23 VITALS
WEIGHT: 247 LBS | BODY MASS INDEX: 35.36 KG/M2 | HEIGHT: 70 IN | SYSTOLIC BLOOD PRESSURE: 152 MMHG | DIASTOLIC BLOOD PRESSURE: 74 MMHG

## 2023-01-23 DIAGNOSIS — R60.0 EDEMA OF BOTH LEGS: ICD-10-CM

## 2023-01-23 DIAGNOSIS — I10 HYPERTENSION, ESSENTIAL: Primary | Chronic | ICD-10-CM

## 2023-01-23 PROCEDURE — 99213 OFFICE O/P EST LOW 20 MIN: CPT | Performed by: PHYSICIAN ASSISTANT

## 2023-01-23 RX ORDER — TORSEMIDE 20 MG/1
20 TABLET ORAL DAILY
Qty: 90 TABLET | Refills: 1 | Status: SHIPPED | OUTPATIENT
Start: 2023-01-23 | End: 2023-03-01 | Stop reason: SDUPTHER

## 2023-01-23 NOTE — ASSESSMENT & PLAN NOTE
Blood pressure is elevated this date however patient reports it has been running around 140/80 at home.  Discussed with patient that this is still above goal and he acknowledged understanding.  Patient prescribed torsemide and advised him to continue taking Exforge.  Advised him to continue to monitor his medication daily hopefully this will help resolve edema

## 2023-01-23 NOTE — PROGRESS NOTES
"Chief Complaint  Bilateral leg swelling x1 year    Subjective        Paul Shook presents to [facility]  History of Present Illness  Patient reports today secondary to having swelling in his lower legs and feet.  Patient reports that has been ongoing since November when his blood pressure medication was adjusted.  Patient denies any shortness of breath, orthopnea cough.  Patient reports feeling well      Objective   Vital Signs:  /74 (BP Location: Right arm, Patient Position: Sitting, Cuff Size: Large Adult)   Ht 177.8 cm (70\")   Wt 112 kg (247 lb)   BMI 35.44 kg/m²   Estimated body mass index is 35.44 kg/m² as calculated from the following:    Height as of this encounter: 177.8 cm (70\").    Weight as of this encounter: 112 kg (247 lb).             Physical Exam  Vitals and nursing note reviewed.   Constitutional:       General: He is not in acute distress.  HENT:      Head: Normocephalic.      Right Ear: Hearing normal.      Left Ear: Hearing normal.   Eyes:      Pupils: Pupils are equal, round, and reactive to light.   Cardiovascular:      Rate and Rhythm: Normal rate and regular rhythm.   Pulmonary:      Effort: Pulmonary effort is normal. No respiratory distress.   Musculoskeletal:      Right lower le+ Edema present.      Left lower le+ Edema present.   Skin:     General: Skin is warm and dry.   Neurological:      Mental Status: He is alert.   Psychiatric:         Mood and Affect: Mood normal.        Result Review :                   Assessment and Plan   Diagnoses and all orders for this visit:    1. Hypertension, essential (Primary)  Assessment & Plan:  Blood pressure is elevated this date however patient reports it has been running around 140/80 at home.  Discussed with patient that this is still above goal and he acknowledged understanding.  Patient prescribed torsemide and advised him to continue taking Exforge.  Advised him to continue to monitor his " medication daily hopefully this will help resolve edema    Orders:  -     torsemide (DEMADEX) 20 MG tablet; Take 1 tablet by mouth Daily. For blood pressure and swelling  Dispense: 90 tablet; Refill: 1    2. Edema of both legs  Assessment & Plan:  See plan above    Orders:  -     torsemide (DEMADEX) 20 MG tablet; Take 1 tablet by mouth Daily. For blood pressure and swelling  Dispense: 90 tablet; Refill: 1           Follow Up   No follow-ups on file.  Patient was given instructions and counseling regarding his condition or for health maintenance advice. Please see specific information pulled into the AVS if appropriate.

## 2023-03-01 ENCOUNTER — OFFICE VISIT (OUTPATIENT)
Dept: FAMILY MEDICINE CLINIC | Facility: CLINIC | Age: 78
End: 2023-03-01
Payer: MEDICARE

## 2023-03-01 VITALS
DIASTOLIC BLOOD PRESSURE: 84 MMHG | HEART RATE: 58 BPM | WEIGHT: 237 LBS | SYSTOLIC BLOOD PRESSURE: 112 MMHG | HEIGHT: 70 IN | BODY MASS INDEX: 33.93 KG/M2 | OXYGEN SATURATION: 97 %

## 2023-03-01 DIAGNOSIS — E78.2 HYPERLIPIDEMIA, MIXED: Chronic | ICD-10-CM

## 2023-03-01 DIAGNOSIS — I10 HYPERTENSION, ESSENTIAL: Chronic | ICD-10-CM

## 2023-03-01 DIAGNOSIS — N40.1 LOWER URINARY TRACT SYMPTOMS DUE TO BENIGN PROSTATIC HYPERPLASIA: ICD-10-CM

## 2023-03-01 DIAGNOSIS — R35.1 NOCTURIA: ICD-10-CM

## 2023-03-01 DIAGNOSIS — E11.29 TYPE 2 DIABETES MELLITUS WITH MICROALBUMINURIA, WITHOUT LONG-TERM CURRENT USE OF INSULIN: Primary | Chronic | ICD-10-CM

## 2023-03-01 DIAGNOSIS — D63.1 ANEMIA DUE TO STAGE 3B CHRONIC KIDNEY DISEASE: ICD-10-CM

## 2023-03-01 DIAGNOSIS — R60.0 EDEMA OF BOTH LEGS: ICD-10-CM

## 2023-03-01 DIAGNOSIS — N18.32 TYPE 2 DIABETES MELLITUS WITH STAGE 3B CHRONIC KIDNEY DISEASE, WITHOUT LONG-TERM CURRENT USE OF INSULIN: Chronic | ICD-10-CM

## 2023-03-01 DIAGNOSIS — R97.20 RAISED PROSTATE SPECIFIC ANTIGEN: ICD-10-CM

## 2023-03-01 DIAGNOSIS — Z23 NEED FOR PROPHYLACTIC VACCINATION AGAINST STREPTOCOCCUS PNEUMONIAE (PNEUMOCOCCUS): ICD-10-CM

## 2023-03-01 DIAGNOSIS — E11.22 TYPE 2 DIABETES MELLITUS WITH STAGE 3B CHRONIC KIDNEY DISEASE, WITHOUT LONG-TERM CURRENT USE OF INSULIN: Chronic | ICD-10-CM

## 2023-03-01 DIAGNOSIS — R80.9 TYPE 2 DIABETES MELLITUS WITH MICROALBUMINURIA, WITHOUT LONG-TERM CURRENT USE OF INSULIN: Primary | Chronic | ICD-10-CM

## 2023-03-01 DIAGNOSIS — E66.09 CLASS 1 OBESITY DUE TO EXCESS CALORIES WITH SERIOUS COMORBIDITY AND BODY MASS INDEX (BMI) OF 34.0 TO 34.9 IN ADULT: ICD-10-CM

## 2023-03-01 DIAGNOSIS — N18.32 ANEMIA DUE TO STAGE 3B CHRONIC KIDNEY DISEASE: ICD-10-CM

## 2023-03-01 PROBLEM — E61.1 IRON DEFICIENCY: Status: RESOLVED | Noted: 2022-11-01 | Resolved: 2023-03-01

## 2023-03-01 LAB — POC MICROALBUMIN URINE: 0

## 2023-03-01 PROCEDURE — 82044 UR ALBUMIN SEMIQUANTITATIVE: CPT | Performed by: FAMILY MEDICINE

## 2023-03-01 PROCEDURE — 36415 COLL VENOUS BLD VENIPUNCTURE: CPT | Performed by: FAMILY MEDICINE

## 2023-03-01 PROCEDURE — 90677 PCV20 VACCINE IM: CPT | Performed by: FAMILY MEDICINE

## 2023-03-01 PROCEDURE — 99214 OFFICE O/P EST MOD 30 MIN: CPT | Performed by: FAMILY MEDICINE

## 2023-03-01 PROCEDURE — G0009 ADMIN PNEUMOCOCCAL VACCINE: HCPCS | Performed by: FAMILY MEDICINE

## 2023-03-01 RX ORDER — GLIPIZIDE 10 MG/1
10 TABLET ORAL
Qty: 180 TABLET | Refills: 1 | Status: SHIPPED | OUTPATIENT
Start: 2023-03-01

## 2023-03-01 RX ORDER — ASPIRIN 81 MG/1
81 TABLET ORAL DAILY
Qty: 90 TABLET | Refills: 1 | Status: SHIPPED | OUTPATIENT
Start: 2023-03-01

## 2023-03-01 RX ORDER — AMLODIPINE AND VALSARTAN 5; 320 MG/1; MG/1
1 TABLET ORAL DAILY
Qty: 90 TABLET | Refills: 1 | Status: SHIPPED | OUTPATIENT
Start: 2023-03-01

## 2023-03-01 RX ORDER — PIOGLITAZONEHYDROCHLORIDE 45 MG/1
45 TABLET ORAL DAILY
Qty: 90 TABLET | Refills: 1 | Status: SHIPPED | OUTPATIENT
Start: 2023-03-01

## 2023-03-01 RX ORDER — TORSEMIDE 20 MG/1
20 TABLET ORAL DAILY
Qty: 90 TABLET | Refills: 1 | Status: SHIPPED | OUTPATIENT
Start: 2023-03-01

## 2023-03-01 RX ORDER — EVOLOCUMAB 140 MG/ML
140 INJECTION, SOLUTION SUBCUTANEOUS
Qty: 6 ML | Refills: 1 | Status: SHIPPED | OUTPATIENT
Start: 2023-03-01

## 2023-03-01 NOTE — ASSESSMENT & PLAN NOTE
Renal condition is improving with treatment.  Continue current treatment regimen.  Renal condition will be reassessed 4 months..    Awaiting recheck on renal function with labs.  Microalbumin negative today.  Nephrology follow-up ongoing given chronic kidney disease   normal

## 2023-03-01 NOTE — ASSESSMENT & PLAN NOTE
Awaiting recheck on anemia with CBC today.    Continues on vitamin supplementation over-the-counter and ongoing nephrology follow-up for anemia related to stage IIIb chronic kidney disease..

## 2023-03-01 NOTE — ASSESSMENT & PLAN NOTE
Continue Repatha injections.    Lipid abnormalities are improving with treatment.  Pharmacotherapy as ordered.  Lipids will be reassessed 4 months..

## 2023-03-01 NOTE — ASSESSMENT & PLAN NOTE
Diabetes is improving with treatment.   Continue current treatment regimen.  Diabetes will be reassessed 4 months..

## 2023-03-01 NOTE — PROGRESS NOTES
"Chief Complaint  Med Refill    Subjective    History of Present Illness:  Paul Shook is a 77 y.o. male who presents today for 4-month follow-up visit medication refills.    He has been doing well since our last visit in November for his Medicare annual wellness visit and physical exam.    He does continue to follow with nephrology given history of chronic kidney disease and urology given recurrent problems with prostate issues chronic PSA elevation.  Would like to establish care with [facility] urology.    His past PSA has been in the 9-10 range and they have discussed prostate biopsy only if his baseline was above this level.    Past-due for followup colonoscopy given polyp on his 2012 colonoscopy.  He does not want a follow-up colonoscopy at this time or Cologuard.     Urine microalb neg 3/2023.  His microalbumin has been positive in the past and he does continue on valsartan for blood pressure control given past cough with ACE inhibitor use.     Eye exam uptodate with Dr Valdivia last appointment done Dec 2022.    Stopped pravachol, atorvastatin, crestor and livalo given myalgias and muscle aches. On Repatha     Followup ongoing with Dr. Jade for CKD.      We discussed vaccination update today -he will consider his COVID booster at his pharmacy.  He is willing to get Prevnar 20 up-to-date.     Declines hep C screening.     Encourage consideration for advanced directive.     He does have a history of anemia related to his chronic kidney disease with hemoglobin in the 9-10 range.  He is on an over-the-counter iron supplement along with B12.  No bleeding problems     Chronic edema improved with torsemide.    Objective   Vital Signs:   /84 (BP Location: Left arm, Patient Position: Sitting, Cuff Size: Adult)   Pulse 58   Ht 177.8 cm (70\")   Wt 108 kg (237 lb)   SpO2 97%   BMI 34.01 kg/m²     Review of Systems   Constitutional: Negative for appetite change, chills and fever.   HENT: Negative for " hearing loss.    Eyes: Positive for blurred vision and visual disturbance.        Plans for cataract surgery due to symptomatic cataract.   Respiratory: Negative for chest tightness.    Cardiovascular: Positive for leg swelling. Negative for chest pain.   Gastrointestinal: Negative for abdominal pain.   Genitourinary: Positive for nocturia.        See HPI   Musculoskeletal: Positive for arthralgias and gait problem.        Using cane for ambulatory support   Skin: Negative for rash.   Psychiatric/Behavioral: Negative for depressed mood.       Past History:  Medical History: has a past medical history of Acute maxillary sinusitis, Anemia, Anemia, chronic disease, Anxiety, Appendicitis, Benign essential hypertension, Blind left eye, Blood chemistry abnormality, BMI 34.0-34.9,adult, Cancer of skin of external cheek, Chronic kidney disease, stage 3a (HCC), Colon polyps, Elevated lipids, Elevated PSA, Eye injury, Knee injury, Mixed hyperlipidemia, Renal disease, Type 2 diabetes mellitus (HCC), and Vitamin D deficiency.   Surgical History: has a past surgical history that includes Skin graft (Right, 2011); Knee surgery (Left); Appendectomy; Esophagogastroduodenoscopy (12/12/2012); and Colonoscopy (12/12/2012).   Family History: family history includes Cancer in his brother; Diabetes in his maternal grandfather and maternal grandmother; Diabetes type II in his mother; Heart disease in an other family member; Hypertension in an other family member; Lung cancer in his sister.   Social History: reports that he has never smoked. He has never used smokeless tobacco. He reports that he does not currently use alcohol. He reports that he does not use drugs.      Current Outpatient Medications:   •  amLODIPine-valsartan (Exforge) 5-320 MG per tablet, Take 1 tablet by mouth Daily. (replaces amlodipine AND LosartanHCTZ), Disp: 90 tablet, Rfl: 1  •  aspirin 81 MG EC tablet, Take 1 tablet by mouth Daily., Disp: 90 tablet, Rfl: 1  •   Cholecalciferol 100 MCG (4000 UT) capsule, Take 1 tablet by mouth Daily., Disp: , Rfl:   •  Evolocumab (Repatha SureClick) solution auto-injector SureClick injection, Inject 1 mL under the skin into the appropriate area as directed Every 14 (Fourteen) Days., Disp: 6 mL, Rfl: 1  •  glipizide (GLUCOTROL) 10 MG tablet, Take 1 tablet by mouth 2 (Two) Times a Day Before Meals., Disp: 180 tablet, Rfl: 1  •  glucose blood test strip, 1 each by Other route Daily. Use as instructed  frestyle lite strips, Disp: , Rfl:   •  pioglitazone (ACTOS) 45 MG tablet, Take 1 tablet by mouth Daily., Disp: 90 tablet, Rfl: 1  •  torsemide (DEMADEX) 20 MG tablet, Take 1 tablet by mouth Daily. For blood pressure and swelling, Disp: 90 tablet, Rfl: 1  •  vitamin B-12 (CYANOCOBALAMIN) 1000 MCG tablet, Take 1 tablet by mouth Daily., Disp: , Rfl:     Allergies: Lisinopril    Physical Exam  Constitutional:       Appearance: He is obese.   HENT:      Head: Normocephalic.      Right Ear: External ear normal.      Left Ear: External ear normal.      Nose: Nose normal.   Eyes:      Extraocular Movements: Extraocular movements intact.      Comments: Chronic corneal clouding unchanged.   Cardiovascular:      Rate and Rhythm: Normal rate and regular rhythm.      Heart sounds: Normal heart sounds.   Pulmonary:      Effort: Pulmonary effort is normal.      Breath sounds: Normal breath sounds.   Musculoskeletal:      Cervical back: Normal range of motion.      Comments: Swelling improved with torsemide.  Using cane for ambulatory support.   Skin:     General: Skin is warm and dry.   Neurological:      General: No focal deficit present.      Mental Status: He is alert.   Psychiatric:         Mood and Affect: Mood normal.         Behavior: Behavior normal.         Thought Content: Thought content normal.          Result Review                   Assessment and Plan  Diagnoses and all orders for this visit:    1. Type 2 diabetes mellitus with microalbuminuria,  without long-term current use of insulin (HCC) (Primary)  Assessment & Plan:  Diabetes is improving with treatment.   Continue current treatment regimen.  Diabetes will be reassessed 4 months..    Orders:  -     POCT microalbumin  -     Pneumococcal Conjugate Vaccine 20-Valent (PCV20)  -     amLODIPine-valsartan (Exforge) 5-320 MG per tablet; Take 1 tablet by mouth Daily. (replaces amlodipine AND LosartanHCTZ)  Dispense: 90 tablet; Refill: 1  -     aspirin 81 MG EC tablet; Take 1 tablet by mouth Daily.  Dispense: 90 tablet; Refill: 1  -     Evolocumab (Repatha SureClick) solution auto-injector SureClick injection; Inject 1 mL under the skin into the appropriate area as directed Every 14 (Fourteen) Days.  Dispense: 6 mL; Refill: 1  -     glipizide (GLUCOTROL) 10 MG tablet; Take 1 tablet by mouth 2 (Two) Times a Day Before Meals.  Dispense: 180 tablet; Refill: 1  -     pioglitazone (ACTOS) 45 MG tablet; Take 1 tablet by mouth Daily.  Dispense: 90 tablet; Refill: 1  -     Comprehensive Metabolic Panel; Future  -     Lipid Panel; Future  -     Hemoglobin A1c; Future  -     Magnesium; Future  -     CBC Auto Differential; Future  -     Comprehensive Metabolic Panel  -     Lipid Panel  -     Hemoglobin A1c  -     Magnesium  -     CBC Auto Differential    2. Type 2 diabetes mellitus with stage 3b chronic kidney disease, without long-term current use of insulin (HCC)  Assessment & Plan:  Renal condition is improving with treatment.  Continue current treatment regimen.  Renal condition will be reassessed 4 months..    Awaiting recheck on renal function with labs.  Microalbumin negative today.  Nephrology follow-up ongoing given chronic kidney disease    Orders:  -     Pneumococcal Conjugate Vaccine 20-Valent (PCV20)  -     amLODIPine-valsartan (Exforge) 5-320 MG per tablet; Take 1 tablet by mouth Daily. (replaces amlodipine AND LosartanHCTZ)  Dispense: 90 tablet; Refill: 1  -     aspirin 81 MG EC tablet; Take 1 tablet by  mouth Daily.  Dispense: 90 tablet; Refill: 1  -     Evolocumab (Repatha SureClick) solution auto-injector SureClick injection; Inject 1 mL under the skin into the appropriate area as directed Every 14 (Fourteen) Days.  Dispense: 6 mL; Refill: 1  -     glipizide (GLUCOTROL) 10 MG tablet; Take 1 tablet by mouth 2 (Two) Times a Day Before Meals.  Dispense: 180 tablet; Refill: 1  -     pioglitazone (ACTOS) 45 MG tablet; Take 1 tablet by mouth Daily.  Dispense: 90 tablet; Refill: 1  -     Comprehensive Metabolic Panel; Future  -     Lipid Panel; Future  -     Hemoglobin A1c; Future  -     Magnesium; Future  -     CBC Auto Differential; Future  -     Comprehensive Metabolic Panel  -     Lipid Panel  -     Hemoglobin A1c  -     Magnesium  -     CBC Auto Differential    3. Hypertension, essential  Assessment & Plan:  Hypertension is improving with treatment.  Continue current treatment regimen.  Blood pressure will be reassessed at the next regular appointment.    Orders:  -     amLODIPine-valsartan (Exforge) 5-320 MG per tablet; Take 1 tablet by mouth Daily. (replaces amlodipine AND LosartanHCTZ)  Dispense: 90 tablet; Refill: 1  -     torsemide (DEMADEX) 20 MG tablet; Take 1 tablet by mouth Daily. For blood pressure and swelling  Dispense: 90 tablet; Refill: 1  -     Comprehensive Metabolic Panel; Future  -     Lipid Panel; Future  -     Magnesium; Future  -     CBC Auto Differential; Future  -     Comprehensive Metabolic Panel  -     Lipid Panel  -     Magnesium  -     CBC Auto Differential    4. Hyperlipidemia, mixed  Assessment & Plan:  Continue Repatha injections.    Lipid abnormalities are improving with treatment.  Pharmacotherapy as ordered.  Lipids will be reassessed 4 months..    Orders:  -     Evolocumab (Repatha SureClick) solution auto-injector SureClick injection; Inject 1 mL under the skin into the appropriate area as directed Every 14 (Fourteen) Days.  Dispense: 6 mL; Refill: 1  -     Comprehensive Metabolic  Panel; Future  -     Lipid Panel; Future  -     Magnesium; Future  -     CBC Auto Differential; Future  -     Comprehensive Metabolic Panel  -     Lipid Panel  -     Magnesium  -     CBC Auto Differential    5. Anemia due to stage 3b chronic kidney disease (HCC)  Assessment & Plan:  Awaiting recheck on anemia with CBC today.    Continues on vitamin supplementation over-the-counter and ongoing nephrology follow-up for anemia related to stage IIIb chronic kidney disease..    Orders:  -     torsemide (DEMADEX) 20 MG tablet; Take 1 tablet by mouth Daily. For blood pressure and swelling  Dispense: 90 tablet; Refill: 1  -     Comprehensive Metabolic Panel; Future  -     CBC Auto Differential; Future  -     Comprehensive Metabolic Panel  -     CBC Auto Differential    6. Lower urinary tract symptoms due to benign prostatic hyperplasia  -     Ambulatory Referral to Urology    7. Nocturia  Assessment & Plan:  Recurrent problems with nocturia and would like to establish care with a new neurologist from [facility].    Chronic baseline PSA elevation in 9-10.  He had been seeing Dr. Skinner with plan to do biopsies if his PSA increased above 10.  We will recheck PSA with labs today and schedule consultation to establish care [facility] urology    Orders:  -     Ambulatory Referral to Urology    8. Raised prostate specific antigen  Assessment & Plan:  See above    Orders:  -     PSA DIAGNOSTIC ONLY; Future  -     Ambulatory Referral to Urology  -     PSA DIAGNOSTIC ONLY    9. Edema of both legs  Assessment & Plan:  Improved with torsemide.  Awaiting recheck on renal function and electrolytes    Orders:  -     torsemide (DEMADEX) 20 MG tablet; Take 1 tablet by mouth Daily. For blood pressure and swelling  Dispense: 90 tablet; Refill: 1  -     Comprehensive Metabolic Panel; Future  -     Magnesium; Future  -     Comprehensive Metabolic Panel  -     Magnesium    10. Need for prophylactic vaccination against Streptococcus  pneumoniae (pneumococcus)  -     Pneumococcal Conjugate Vaccine 20-Valent (PCV20)    11. Class 1 obesity due to excess calories with serious comorbidity and body mass index (BMI) of 34.0 to 34.9 in adult      Class 2 Severe Obesity (BMI >=35 and <=39.9). Obesity-related health conditions include the following: hypertension, diabetes mellitus, dyslipidemias, osteoarthritis and lower extremity venous stasis disease. Obesity is unchanged. BMI is is above average; BMI management plan is completed. We discussed portion control and increasing exercise.          Follow Up  Return in about 4 months (around 7/1/2023) for Med recheck, Fasting for labs at appointment (but drink water!).    Dwayne Silva MD  Answers for HPI/ROS submitted by the patient on 2/26/2023  What is the primary reason for your visit?: Other  Please describe your symptoms.: 4 Month check up for medicine , check on legs swelling  Have you had these symptoms before?: Yes  How long have you been having these symptoms?: Greater than 2 weeks  Please list any medications you are currently taking for this condition.: on chart

## 2023-03-01 NOTE — ASSESSMENT & PLAN NOTE
Recurrent problems with nocturia and would like to establish care with a new neurologist from [facility].    Chronic baseline PSA elevation in 9-10.  He had been seeing Dr. Skinner with plan to do biopsies if his PSA increased above 10.  We will recheck PSA with labs today and schedule consultation to establish care [facility] urology

## 2023-03-02 LAB
ALBUMIN SERPL-MCNC: 4.1 G/DL (ref 3.7–4.7)
ALBUMIN/GLOB SERPL: 1.2 {RATIO} (ref 1.2–2.2)
ALP SERPL-CCNC: 79 IU/L (ref 44–121)
ALT SERPL-CCNC: 7 IU/L (ref 0–44)
AST SERPL-CCNC: 13 IU/L (ref 0–40)
BASOPHILS # BLD AUTO: 0 X10E3/UL (ref 0–0.2)
BASOPHILS NFR BLD AUTO: 1 %
BILIRUB SERPL-MCNC: 0.2 MG/DL (ref 0–1.2)
BUN SERPL-MCNC: 51 MG/DL (ref 8–27)
BUN/CREAT SERPL: 22 (ref 10–24)
CALCIUM SERPL-MCNC: 9.5 MG/DL (ref 8.6–10.2)
CHLORIDE SERPL-SCNC: 97 MMOL/L (ref 96–106)
CHOLEST SERPL-MCNC: 102 MG/DL (ref 100–199)
CO2 SERPL-SCNC: 21 MMOL/L (ref 20–29)
CREAT SERPL-MCNC: 2.33 MG/DL (ref 0.76–1.27)
EGFRCR SERPLBLD CKD-EPI 2021: 28 ML/MIN/1.73
EOSINOPHIL # BLD AUTO: 0.1 X10E3/UL (ref 0–0.4)
EOSINOPHIL NFR BLD AUTO: 1 %
ERYTHROCYTE [DISTWIDTH] IN BLOOD BY AUTOMATED COUNT: 14.5 % (ref 11.6–15.4)
GLOBULIN SER CALC-MCNC: 3.3 G/DL (ref 1.5–4.5)
GLUCOSE SERPL-MCNC: 119 MG/DL (ref 70–99)
HBA1C MFR BLD: 6.9 % (ref 4.8–5.6)
HCT VFR BLD AUTO: 28.9 % (ref 37.5–51)
HDLC SERPL-MCNC: 26 MG/DL
HGB BLD-MCNC: 9.4 G/DL (ref 13–17.7)
IMM GRANULOCYTES # BLD AUTO: 0 X10E3/UL (ref 0–0.1)
IMM GRANULOCYTES NFR BLD AUTO: 0 %
LDLC SERPL CALC-MCNC: 45 MG/DL (ref 0–99)
LYMPHOCYTES # BLD AUTO: 1.5 X10E3/UL (ref 0.7–3.1)
LYMPHOCYTES NFR BLD AUTO: 20 %
MAGNESIUM SERPL-MCNC: 2.1 MG/DL (ref 1.6–2.3)
MCH RBC QN AUTO: 30 PG (ref 26.6–33)
MCHC RBC AUTO-ENTMCNC: 32.5 G/DL (ref 31.5–35.7)
MCV RBC AUTO: 92 FL (ref 79–97)
MONOCYTES # BLD AUTO: 0.5 X10E3/UL (ref 0.1–0.9)
MONOCYTES NFR BLD AUTO: 7 %
NEUTROPHILS # BLD AUTO: 5.2 X10E3/UL (ref 1.4–7)
NEUTROPHILS NFR BLD AUTO: 71 %
PLATELET # BLD AUTO: 221 X10E3/UL (ref 150–450)
POTASSIUM SERPL-SCNC: 4.5 MMOL/L (ref 3.5–5.2)
PROT SERPL-MCNC: 7.4 G/DL (ref 6–8.5)
PSA SERPL-MCNC: 9.4 NG/ML (ref 0–4)
RBC # BLD AUTO: 3.13 X10E6/UL (ref 4.14–5.8)
SODIUM SERPL-SCNC: 138 MMOL/L (ref 134–144)
TRIGL SERPL-MCNC: 190 MG/DL (ref 0–149)
VLDLC SERPL CALC-MCNC: 31 MG/DL (ref 5–40)
WBC # BLD AUTO: 7.3 X10E3/UL (ref 3.4–10.8)

## 2023-03-08 ENCOUNTER — TELEPHONE (OUTPATIENT)
Dept: FAMILY MEDICINE CLINIC | Facility: CLINIC | Age: 78
End: 2023-03-08
Payer: MEDICARE

## 2023-03-08 DIAGNOSIS — N18.32 TYPE 2 DIABETES MELLITUS WITH STAGE 3B CHRONIC KIDNEY DISEASE, WITHOUT LONG-TERM CURRENT USE OF INSULIN: Primary | ICD-10-CM

## 2023-03-08 DIAGNOSIS — E11.22 TYPE 2 DIABETES MELLITUS WITH STAGE 3B CHRONIC KIDNEY DISEASE, WITHOUT LONG-TERM CURRENT USE OF INSULIN: Primary | ICD-10-CM

## 2023-03-08 NOTE — TELEPHONE ENCOUNTER
Pt was last seen 3/1 talked about kidney issues tried scheduling with [facility] but they need a referral per dr akins please advise

## 2023-03-29 ENCOUNTER — OFFICE VISIT (OUTPATIENT)
Dept: UROLOGY | Facility: CLINIC | Age: 78
End: 2023-03-29
Payer: MEDICARE

## 2023-03-29 VITALS — BODY MASS INDEX: 33.93 KG/M2 | HEIGHT: 70 IN | WEIGHT: 237 LBS | OXYGEN SATURATION: 98 % | HEART RATE: 64 BPM

## 2023-03-29 DIAGNOSIS — R97.20 RAISED PROSTATE SPECIFIC ANTIGEN: Chronic | ICD-10-CM

## 2023-03-29 DIAGNOSIS — N40.1 LOWER URINARY TRACT SYMPTOMS DUE TO BENIGN PROSTATIC HYPERPLASIA: Primary | Chronic | ICD-10-CM

## 2023-03-29 DIAGNOSIS — R35.1 NOCTURIA: Chronic | ICD-10-CM

## 2023-03-29 PROCEDURE — 1160F RVW MEDS BY RX/DR IN RCRD: CPT | Performed by: UROLOGY

## 2023-03-29 PROCEDURE — 99205 OFFICE O/P NEW HI 60 MIN: CPT | Performed by: UROLOGY

## 2023-03-29 PROCEDURE — 1159F MED LIST DOCD IN RCRD: CPT | Performed by: UROLOGY

## 2023-03-29 RX ORDER — PREDNISOLONE ACETATE 10 MG/ML
SUSPENSION/ DROPS OPHTHALMIC
COMMUNITY
Start: 2023-03-13

## 2023-03-29 RX ORDER — KETOROLAC TROMETHAMINE 5 MG/ML
SOLUTION OPHTHALMIC
COMMUNITY
Start: 2023-03-13

## 2023-03-29 RX ORDER — MOXIFLOXACIN 5 MG/ML
SOLUTION/ DROPS OPHTHALMIC
COMMUNITY
Start: 2023-03-13

## 2023-03-29 RX ORDER — TAMSULOSIN HYDROCHLORIDE 0.4 MG/1
CAPSULE ORAL
COMMUNITY
Start: 2023-03-27
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
## 2023-05-02 ENCOUNTER — LAB (OUTPATIENT)
Dept: FAMILY MEDICINE CLINIC | Facility: CLINIC | Age: 78
End: 2023-05-02
Payer: MEDICARE

## 2023-05-02 DIAGNOSIS — N17.9 ACUTE RENAL FAILURE, UNSPECIFIED ACUTE RENAL FAILURE TYPE: Primary | ICD-10-CM

## 2023-05-03 ENCOUNTER — PATIENT MESSAGE (OUTPATIENT)
Dept: FAMILY MEDICINE CLINIC | Facility: CLINIC | Age: 78
End: 2023-05-03
Payer: MEDICARE

## 2023-05-03 LAB
ALBUMIN SERPL-MCNC: 4.3 G/DL (ref 3.7–4.7)
BUN SERPL-MCNC: 50 MG/DL (ref 8–27)
BUN/CREAT SERPL: 17 (ref 10–24)
CALCIUM SERPL-MCNC: 9.2 MG/DL (ref 8.6–10.2)
CHLORIDE SERPL-SCNC: 98 MMOL/L (ref 96–106)
CO2 SERPL-SCNC: 22 MMOL/L (ref 20–29)
CREAT SERPL-MCNC: 2.87 MG/DL (ref 0.76–1.27)
EGFRCR SERPLBLD CKD-EPI 2021: 22 ML/MIN/1.73
GLUCOSE SERPL-MCNC: 153 MG/DL (ref 70–99)
HCT VFR BLD AUTO: 25.5 % (ref 37.5–51)
HGB BLD-MCNC: 8 G/DL (ref 13–17.7)
IGA SERPL-MCNC: 293 MG/DL (ref 61–437)
IGG SERPL-MCNC: 1338 MG/DL (ref 603–1613)
IGM SERPL-MCNC: 161 MG/DL (ref 15–143)
PHOSPHATE SERPL-MCNC: 4.6 MG/DL (ref 2.8–4.1)
POTASSIUM SERPL-SCNC: 4.3 MMOL/L (ref 3.5–5.2)
PROT PATTERN SERPL IFE-IMP: ABNORMAL
SODIUM SERPL-SCNC: 137 MMOL/L (ref 134–144)

## 2023-05-04 NOTE — PROGRESS NOTES
THE MESSAGE BELOW IS ABLE TO BE GIVEN BY THE HUB.  THE HUB MAY SCHEDULE A FOLLOW-UP VISIT FOR THE PATIENT IF INDICATED IN THE MESSAGE BELOW...    Please make sure to fax a copy of his recent lab work to his nephrologist who ordered his lab work and let patient know that his renal function continues to decline and it is important that he continues to follow-up with nephrology and let him know that we have faxed his results to his nephrologist who ordered his recent blood work.  We will need to follow-up with them regarding his lab results and any treatment change recommendations.

## 2023-05-04 NOTE — PROGRESS NOTES
THE MESSAGE BELOW IS ABLE TO BE GIVEN BY THE HUB.  THE HUB MAY SCHEDULE A FOLLOW-UP VISIT FOR THE PATIENT IF INDICATED IN THE MESSAGE BELOW...    Please fax a copy of his labs to his nephrologist to ordered his recent blood work related to worsening kidney function.    Again, please let patient know that his recent blood work ordered by nephrology returned abnormal and he needs to follow-up with his nephrologist regarding treatment change recommendations based on his blood work that was recently done in our office.

## 2023-05-04 NOTE — TELEPHONE ENCOUNTER
From: Paul Shook  To: Dwayne Silva  Sent: 5/3/2023 4:08 PM EDT  Subject: disabled parking placard    I would like to see if you would sign an application for me to obtain a disable parking permit from the [facility] 's office. If so, would it be possible for me to have my niece, Sara, bring the form into the office for your signature.  Thank you.

## 2023-05-05 ENCOUNTER — TELEPHONE (OUTPATIENT)
Dept: FAMILY MEDICINE CLINIC | Facility: CLINIC | Age: 78
End: 2023-05-05
Payer: MEDICARE

## 2023-05-05 NOTE — TELEPHONE ENCOUNTER
----- Message from Dwayne Silva MD sent at 5/4/2023  7:36 AM EDT -----  THE MESSAGE BELOW IS ABLE TO BE GIVEN BY THE HUB.  THE HUB MAY SCHEDULE A FOLLOW-UP VISIT FOR THE PATIENT IF INDICATED IN THE MESSAGE BELOW...    Please fax a copy of his labs to his nephrologist to ordered his recent blood work related to worsening kidney function.    Again, please let patient know that his recent blood work ordered by nephrology returned abnormal and he needs to follow-up with his nephrologist regarding treatment change recommendations based on his blood work that was recently done in our office.

## 2023-05-05 NOTE — TELEPHONE ENCOUNTER
"Left message for patient to call back. Please see message below. HUB CAN READ. Patient has \"seen\" message on [employer].  "

## 2023-06-02 ENCOUNTER — LAB (OUTPATIENT)
Dept: FAMILY MEDICINE CLINIC | Facility: CLINIC | Age: 78
End: 2023-06-02
Payer: MEDICARE

## 2023-06-02 DIAGNOSIS — N17.0 ACUTE RENAL FAILURE WITH ACUTE TUBULAR NECROSIS SUPERIMPOSED ON STAGE 1 CHRONIC KIDNEY DISEASE: ICD-10-CM

## 2023-06-02 DIAGNOSIS — N17.9 ACUTE RENAL FAILURE, UNSPECIFIED ACUTE RENAL FAILURE TYPE: Primary | ICD-10-CM

## 2023-06-02 DIAGNOSIS — N18.1 ACUTE RENAL FAILURE WITH ACUTE TUBULAR NECROSIS SUPERIMPOSED ON STAGE 1 CHRONIC KIDNEY DISEASE: ICD-10-CM

## 2023-06-03 LAB
ALBUMIN SERPL-MCNC: 3.7 G/DL (ref 3.7–4.7)
BUN SERPL-MCNC: 33 MG/DL (ref 8–27)
BUN/CREAT SERPL: 14 (ref 10–24)
CALCIUM SERPL-MCNC: 8.9 MG/DL (ref 8.6–10.2)
CHLORIDE SERPL-SCNC: 103 MMOL/L (ref 96–106)
CO2 SERPL-SCNC: 19 MMOL/L (ref 20–29)
CREAT SERPL-MCNC: 2.36 MG/DL (ref 0.76–1.27)
EGFRCR SERPLBLD CKD-EPI 2021: 28 ML/MIN/1.73
GLUCOSE SERPL-MCNC: 154 MG/DL (ref 70–99)
HCT VFR BLD AUTO: 25 % (ref 37.5–51)
HGB BLD-MCNC: 7.9 G/DL (ref 13–17.7)
IRON SATN MFR SERPL: 15 % (ref 15–55)
IRON SERPL-MCNC: 47 UG/DL (ref 38–169)
PHOSPHATE SERPL-MCNC: 3.7 MG/DL (ref 2.8–4.1)
POTASSIUM SERPL-SCNC: 4.8 MMOL/L (ref 3.5–5.2)
SODIUM SERPL-SCNC: 139 MMOL/L (ref 134–144)
TIBC SERPL-MCNC: 306 UG/DL (ref 250–450)
UIBC SERPL-MCNC: 259 UG/DL (ref 111–343)

## 2023-07-10 PROBLEM — E66.812 CLASS 2 SEVERE OBESITY DUE TO EXCESS CALORIES WITH SERIOUS COMORBIDITY AND BODY MASS INDEX (BMI) OF 36.0 TO 36.9 IN ADULT: Status: ACTIVE | Noted: 2023-07-10

## 2023-07-10 PROBLEM — E66.01 CLASS 2 SEVERE OBESITY DUE TO EXCESS CALORIES WITH SERIOUS COMORBIDITY AND BODY MASS INDEX (BMI) OF 36.0 TO 36.9 IN ADULT: Chronic | Status: ACTIVE | Noted: 2023-07-10

## 2023-07-10 PROBLEM — E66.812 CLASS 2 SEVERE OBESITY DUE TO EXCESS CALORIES WITH SERIOUS COMORBIDITY AND BODY MASS INDEX (BMI) OF 36.0 TO 36.9 IN ADULT: Chronic | Status: ACTIVE | Noted: 2023-07-10

## 2023-07-10 PROBLEM — E66.01 CLASS 2 SEVERE OBESITY DUE TO EXCESS CALORIES WITH SERIOUS COMORBIDITY AND BODY MASS INDEX (BMI) OF 36.0 TO 36.9 IN ADULT: Status: ACTIVE | Noted: 2023-07-10

## 2023-09-13 ENCOUNTER — TELEPHONE (OUTPATIENT)
Dept: FAMILY MEDICINE CLINIC | Facility: CLINIC | Age: 78
End: 2023-09-13

## 2023-09-13 NOTE — TELEPHONE ENCOUNTER
Caller: SHRUTHIKRISTA HURTADO    Relationship: Brother/Sister    Best call back number: [phone]     What was the call regarding:   PATIENT'S (SISTER) SHRUTHI STATED THAT SHE WAS INFORMED BY PATIENT'S INSURANCE THAT THIS MEDICATION NEEDS A PRIOR AUTHORIZATION FOR INSURANCE TO COVER     Evolocumab (Repatha SureClick) solution auto-injector SureClick injection     PATIENT WOULD LIKE FOR THIS TO BE HANDLED AS SOON AS POSSIBLE DUE TO BEING COMPLETLEY OUT OF MEDICATION AT THIS TIME     [facility] [id number] - [place], [place] - [address] [place] [address] AT  [address] - [phone]  - [phone]

## 2023-09-18 DIAGNOSIS — N18.32 TYPE 2 DIABETES MELLITUS WITH STAGE 3B CHRONIC KIDNEY DISEASE, WITHOUT LONG-TERM CURRENT USE OF INSULIN: Chronic | ICD-10-CM

## 2023-09-18 DIAGNOSIS — R80.9 TYPE 2 DIABETES MELLITUS WITH MICROALBUMINURIA, WITHOUT LONG-TERM CURRENT USE OF INSULIN: Chronic | ICD-10-CM

## 2023-09-18 DIAGNOSIS — E78.2 HYPERLIPIDEMIA, MIXED: Chronic | ICD-10-CM

## 2023-09-18 DIAGNOSIS — E11.29 TYPE 2 DIABETES MELLITUS WITH MICROALBUMINURIA, WITHOUT LONG-TERM CURRENT USE OF INSULIN: Chronic | ICD-10-CM

## 2023-09-18 DIAGNOSIS — E11.22 TYPE 2 DIABETES MELLITUS WITH STAGE 3B CHRONIC KIDNEY DISEASE, WITHOUT LONG-TERM CURRENT USE OF INSULIN: Chronic | ICD-10-CM

## 2023-09-19 NOTE — TELEPHONE ENCOUNTER
SHRUTHI CALLED BACK TO CHECK ON THIS. SAYS THE PRESCRIPTION CAN ONLY BE FOR 30 DAYS AND A NEW ONE NEEDS SENT TO [facility] IN [place] SO IT CAN BE FILLED TODAY.

## 2023-09-21 NOTE — TELEPHONE ENCOUNTER
Please see message below regarding need for prior authorization for his Repatha injections.  I have not seen any paperwork on the PACS for this and this needs to be sent through cover my meds

## 2023-09-21 NOTE — TELEPHONE ENCOUNTER
PATIENT'S WIFE STATES THAT SHE HAS BEEN CALLING FOR TWO WEEKS TO GET THIS PRIOR AUTHORIZATION TAKEN CARE OF.  HE MISSED A DOSE OF MEDICINE LAST WEEK.

## 2023-09-22 RX ORDER — EVOLOCUMAB 140 MG/ML
140 INJECTION, SOLUTION SUBCUTANEOUS
Qty: 6 ML | Refills: 1 | Status: SHIPPED | OUTPATIENT
Start: 2023-09-22

## 2023-10-30 ENCOUNTER — TELEPHONE (OUTPATIENT)
Dept: FAMILY MEDICINE CLINIC | Facility: CLINIC | Age: 78
End: 2023-10-30
Payer: MEDICARE

## 2023-10-30 NOTE — TELEPHONE ENCOUNTER
----- Message from Paul Boone sent at 10/30/2023  2:58 PM EDT -----  Regarding: Appointment canceled  Contact: [phone]  Appointment canceled for Paul Almonteworth ([id number])  Visit Type: SUBSEQUENT MEDICARE WELLNESS  Date        Time      Length    Provider                  Department  11/14/2023   9:30 AM  30 mins.  Dwayne RABAGO [facility]    Reason for Cancellation: Other    Patient Comments: Insurance

## 2023-10-30 NOTE — TELEPHONE ENCOUNTER
Spoke with his sister Shweta Dash Patient is not home but will have him call back and ask for Nini Arana.    Patient can still be seen in our office. Patient might just pay out of network benefits but needs to call and see if he has continuity of care as a benefit.

## 2024-04-02 ENCOUNTER — OFFICE VISIT (OUTPATIENT)
Dept: UROLOGY | Facility: CLINIC | Age: 79
End: 2024-04-02

## 2024-04-02 VITALS — HEART RATE: 64 BPM | BODY MASS INDEX: 33.03 KG/M2 | HEIGHT: 69 IN | WEIGHT: 223 LBS | OXYGEN SATURATION: 99 %

## 2024-04-02 DIAGNOSIS — R39.9 LOWER URINARY TRACT SYMPTOMS (LUTS): Primary | ICD-10-CM
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
## 2024-05-01 ENCOUNTER — PROCEDURE VISIT (OUTPATIENT)
Dept: UROLOGY | Facility: CLINIC | Age: 79
End: 2024-05-01
Payer: MEDICARE

## 2024-05-01 ENCOUNTER — TELEPHONE (OUTPATIENT)
Dept: UROLOGY | Facility: CLINIC | Age: 79
End: 2024-05-01

## 2024-05-01 VITALS
WEIGHT: 226 LBS | HEIGHT: 69 IN | HEART RATE: 62 BPM | SYSTOLIC BLOOD PRESSURE: 128 MMHG | OXYGEN SATURATION: 99 % | BODY MASS INDEX: 33.47 KG/M2 | DIASTOLIC BLOOD PRESSURE: 80 MMHG

## 2024-05-01 DIAGNOSIS — R39.9 LOWER URINARY TRACT SYMPTOMS (LUTS): Primary | ICD-10-CM

## 2024-05-01 NOTE — PROGRESS NOTES
Preprocedure diagnosis:  Hyperplasia of prostate with LUTS    Postprocedure diagnosis:  Hyperplasia of prostate with LUTS    Procedure:  Diagnostic Cystourethroscopy  Transrectal Ultrasound of the Prostate for Volume  Uroflowmetry + PVR    Attending surgeon:  Iglesia Tesfaye MD    Anesthesia:  2% lidocaine jelly intraurethrally    Complications:  None    Indications:  78 y.o. male undergoing a diagnostic cystoscopy and transrectal ultrasound of the prostate for volume for the above mentioned indications.  Informed consent was obtained.      Findings:  Cystoscopic findings normal pendulous, bulbar, membranous urethra.  Within the prostatic urethra there was lateral lobe coaptation, with median bar.   Within the urinary bladder formal cystoscopy was performed with normal bladder mucosa and no tumors, masses or stones. Right and left ureteral orifice were identified and in the normal orthotopic position.     The dimensions of the prostate were measured for a calculated volume of 80 g.      Procedure:  The patient was placed in supine position and prepped and draped in sterile fashion with lidocaine jelly instill per the urethra for anesthesia 5 minutes prior to procedure start.  A brief timeout was performed including available nursing staff and the patient.  The 16 Fr digital flexible cystoscope was lubricated and gently advanced into the urethral meatus. The penile, bulbar, and membranous urethra appeared widely patent without obvious stricture or mucosal abnormality. Within the prostatic urethra there was significant lateral lobe coaptation, with median bar. The cytoscope was then advanced into the bladder. The bladder was completely visualized starting with the trigone. There were bilateral orthotopic ureteral orifices. The posterior wall, lateral walls, anterior wall, and dome were completely visualized WITHOUT obvious mucosal lesions, tumors, stones.  The cystoscope was retroflexed and the bladder neck and  prostate were further visualized. The cystoscope was then gently withdrawn while visualizing the urethra and the procedure was then terminated.  The patient tolerated the procedure well.      A digital rectal exam was performed.The rectal ultrasound probe was atraumatically introduced into the rectum.  The prostate and seminal vesicles were inspected systematically using axial and sagittal views with the ultrasound. The ultrasound prove was then removed.     Uroflow:  Average flow rate:< 10 ml/s  Maximum flow rate:< 10 ml/s        Follow Up: Post procedurally we have discussed findings of cystoscopy and TRUS for volume.  We have provided educational material taken today regarding potential bladder outlet procedures.  Follow-up: 2 to 3 weeks for surgical discussion.

## 2024-05-01 NOTE — TELEPHONE ENCOUNTER
Sent Simple ITt message to patient explaining to him that we will keep this appointment and schedule surgery at that time.

## 2024-05-01 NOTE — TELEPHONE ENCOUNTER
Caller: JOELLEN LUEVANO     Relationship: [unfilled] [facility]    Best call back number: [phone]    What is your medical concern? PT HAS DECIDED HE WANTS TO PROCEED WITH SURGERY.  WILL HE NEED TO KEEP THE APPT ON 05.15.2024? PLEASE CALL JOELLEN TO ADVISE. THANK YOU

## 2024-05-02 ENCOUNTER — TELEPHONE (OUTPATIENT)
Dept: UROLOGY | Facility: CLINIC | Age: 79
End: 2024-05-02

## 2024-05-02 NOTE — TELEPHONE ENCOUNTER
[facility] received a fax that requires your attention. The document has been indexed to the patient’s chart for your review.      Reason for sending: APPROVAL FOR PROCEDURE CODE 23024, 5/1/24-7/29/24    Documents Description: PROCEDURE APPVL_[facility]ARINDER MCR ADV_05/01/24-7/29/24    Name of Sender: [employer]    Date Indexed: 05/02/24

## 2024-05-15 ENCOUNTER — OFFICE VISIT (OUTPATIENT)
Dept: UROLOGY | Facility: CLINIC | Age: 79
End: 2024-05-15
Payer: MEDICARE

## 2024-05-15 VITALS — BODY MASS INDEX: 33.33 KG/M2 | WEIGHT: 225 LBS | HEIGHT: 69 IN

## 2024-05-15 DIAGNOSIS — N40.1 BENIGN PROSTATIC HYPERPLASIA WITH URINARY OBSTRUCTION: ICD-10-CM

## 2024-05-15 DIAGNOSIS — N13.8 BENIGN PROSTATIC HYPERPLASIA WITH URINARY OBSTRUCTION: ICD-10-CM

## 2024-05-15 DIAGNOSIS — R39.9 LOWER URINARY TRACT SYMPTOMS (LUTS): Primary | ICD-10-CM

## 2024-05-15 RX ORDER — PREDNISONE 10 MG/1
TABLET ORAL
COMMUNITY
Start: 2024-05-09

## 2024-05-15 NOTE — PROGRESS NOTES
Follow Up Office Visit      Patient Name: Paul Shook  : 1945   MRN: [id number]     Chief Complaint:    Chief Complaint   Patient presents with    Lower urinary tract symptoms (LUTS)       History of Present Illness: Paul Shook is a 78 y.o. male who presents today for follow up continue discussion of lower urinary tract symptoms.  Patient has bother associate with storage and voiding symptoms.  He has undergone prior UroLift procedure with outside facility urologist.  He continues to report bother with symptoms.  Underwent recent cystoscopy and TRUS for volume with continued lateral lobe coaptation demonstrated, prostate volume approx. 80 g on TRUS.  Turns today for further discussion.  Continues to report bother associate with symptoms, progression of symptoms on alpha-blocker therapy.    Subjective      Review of System: Review of Systems   Genitourinary:  Negative for decreased urine volume, difficulty urinating, dysuria, enuresis, flank pain, frequency, hematuria and urgency.      I have reviewed the ROS documented by my clinical staff, updated as appropriate and I agree. Iglesia Tesfaye MD    I have reviewed and the following portions of the patient's history were updated as appropriate: past family history, past medical history, past social history, past surgical history and problem list.    Medications:     Current Outpatient Medications:     amLODIPine-valsartan (Exforge) 5-320 MG per tablet, Take 1 tablet by mouth Daily., Disp: 90 tablet, Rfl: 1    aspirin 81 MG EC tablet, Take 1 tablet by mouth Daily., Disp: 90 tablet, Rfl: 1    Cholecalciferol 100 MCG (4000 UT) capsule, Take 1 tablet by mouth Daily., Disp: , Rfl:     Evolocumab (Repatha SureClick) solution auto-injector SureClick injection, Inject 1 mL under the skin into the appropriate area as directed Every 14 (Fourteen) Days., Disp: 6 mL, Rfl: 1    ferrous sulfate 325 (65 FE) MG tablet, Take 1 tablet by mouth 2 (Two) Times a Day.,  Disp: , Rfl:     glipizide (GLUCOTROL) 10 MG tablet, Take 1 tablet by mouth Daily With Breakfast., Disp: 90 tablet, Rfl: 1    glucose blood test strip, 1 each by Other route Daily. Use as instructed  frestyle lite strips, Disp: , Rfl:     ketorolac (ACULAR) 0.5 % ophthalmic solution, , Disp: , Rfl:     pioglitazone (ACTOS) 45 MG tablet, Take 1 tablet by mouth Daily., Disp: 90 tablet, Rfl: 1    tamsulosin (FLOMAX) 0.4 MG capsule 24 hr capsule, , Disp: , Rfl:     torsemide (DEMADEX) 20 MG tablet, Take 1 tablet by mouth Daily. For blood pressure and swelling, Disp: 90 tablet, Rfl: 1    vitamin B-12 (CYANOCOBALAMIN) 1000 MCG tablet, Take 1 tablet by mouth Daily., Disp: , Rfl:     predniSONE (DELTASONE) 10 MG tablet, , Disp: , Rfl:     Allergies:   Allergies   Allergen Reactions    Lisinopril Cough       IPSS Questionnaire (AUA-7):  Over the past month…    1)  Incomplete Emptying  How often have you had a sensation of not emptying your bladder?  5 - Almost always   2)  Frequency  How often have you had to urinate less than every two hours? 4 - More than half the time   3)  Intermittency  How often have you found you stopped and started again several times when you urinated?  4 - More than half the time   4) Urgency  How often have you found it difficult to postpone urination?  4 - More than half the time   5) Weak Stream  How often have you had a weak urinary stream?  4 - More than half the time   6) Straining  How often have you had to push or strain to begin urination?  4 - More than half the time   7) Nocturia  How many times did you typically get up at night to urinate?  4 - 4 times   Total Score:  30       Quality of life due to urinary symptoms:  If you were to spend the rest of your life with your urinary condition the way it is now, how would you feel about that? 5-Unhappy   Urine Leakage (Incontinence) 0-No Leakage       Objective     Physical Exam:   Vital Signs:   Vitals:    05/15/24 0819   Weight: 102 kg (225  "lb)   Height: 175.3 cm (69.02\")   PainSc: 0-No pain     Body mass index is 33.21 kg/m².     Physical Exam  Vitals and nursing note reviewed.   Constitutional:       Appearance: Normal appearance.   HENT:      Head: Normocephalic and atraumatic.   Cardiovascular:      Comments: Well perfused  Pulmonary:      Effort: Pulmonary effort is normal.   Abdominal:      General: Abdomen is flat.      Palpations: Abdomen is soft.   Musculoskeletal:         General: Normal range of motion.   Skin:     General: Skin is warm and dry.   Neurological:      General: No focal deficit present.      Mental Status: He is alert and oriented to person, place, and time. Mental status is at baseline.   Psychiatric:         Mood and Affect: Mood normal.         Behavior: Behavior normal.         Thought Content: Thought content normal.         Judgment: Judgment normal.             Labs:   Brief Urine Lab Results       None                 Lab Results   Component Value Date    GLUCOSE 90 07/10/2023    CALCIUM 8.8 07/10/2023     07/10/2023    K 4.9 07/10/2023    CO2 22 07/10/2023     07/10/2023    BUN 29 (H) 07/10/2023    CREATININE 1.95 (H) 07/10/2023    BCR 15 07/10/2023       Lab Results   Component Value Date    WBC 7.3 03/01/2023    HGB 7.9 (L) 06/02/2023    HCT 25.0 (L) 06/02/2023    MCV 92 03/01/2023     03/01/2023       Images:   No Images in the past 120 days found..    Measures:   Tobacco:   Paulsmith AlmonteTaylor  reports that he has never smoked. He has never been exposed to tobacco smoke. He has never used smokeless tobacco. I have educated him on the risk of diseases from using tobacco products.   Assessment / Plan      Assessment/Plan:   78 y.o. male is seen today for follow up continue discussion regarding management of storage and voiding urinary symptoms.  He has undergone prior UroLift procedure with outside provider, has had progression of symptoms after procedure with alpha-blocker therapy.  Recent cystoscopy " revealing continued lateral lobe coaptation, prostate ultrasound with prostate 80 g on volume.  We have discussed the indication for greenlight PVP given continued symptoms, progression of symptoms and coaptation after UroLift.  Patient has obstructive uroflow pattern.  Given patient's symptom status he would like to undergo intervention.  We have discussed the risks and benefits of procedure, expected perioperative and postoperative course.  Coordinate and schedule pending patient in OR availability.    Diagnoses and all orders for this visit:    1. Lower urinary tract symptoms (LUTS) (Primary)  -     acetaminophen (TYLENOL) tablet 1,000 mg  -     gabapentin (NEURONTIN) capsule 600 mg  -     scopolamine patch 1 mg/72 hr  -     Case Request; Standing  -     ceFAZolin (ANCEF) 2,000 mg in sodium chloride 0.9 % 100 mL IVPB  -     Case Request    2. Benign prostatic hyperplasia with urinary obstruction  -     Case Request; Standing  -     Case Request    Other orders  -     Follow Anesthesia Guidelines / Protocol; Future  -     Provide NPO Instructions to Patient; Future  -     Provide Hydration Instructions to Patient; Future  -     Provide Patient With Enhanced Recovery Booklet(s) or Handout; Future  -     Provide Chlorhexidine Skin Prep Wipes and Instructions; Future  -     Obtain Informed Consent; Future  -     Nursing to Order Blood Glucose on all Inpatients >18 years Old with not BMP Ordered; Future  -     Nursing to Place Order for HgbA1c on Adult Patients >18 Years Old (HgbA1c Within One Month of Admission Acceptable); Future  -     Follow Anesthesia Guidelines / Protocol; Standing  -     Provide Patient With Enhanced Recovery Booklet(s) OR Handout; Standing  -     Verify NPO Status; Standing  -     Verify the Time Patient Completed Gatorade / G2; Standing  -     Place Sequential Compression Device; Standing  -     Maintain Sequential Compression Device; Standing            I spent approximately 30 minutes  providing clinical care for this patient; including review of patient's chart and provider documentation, face to face time spent with patient in examination room (obtaining history, performing physical exam, discussing diagnosis and management options), placing orders, and completing patient documentation.     Iglesia Tesfaye MD  [facility]

## 2024-05-20 PROBLEM — N40.1 BENIGN PROSTATIC HYPERPLASIA WITH URINARY OBSTRUCTION: Status: ACTIVE | Noted: 2024-05-15

## 2024-05-20 PROBLEM — N13.8 BENIGN PROSTATIC HYPERPLASIA WITH URINARY OBSTRUCTION: Status: ACTIVE | Noted: 2024-05-15

## 2024-05-20 RX ORDER — ACETAMINOPHEN 500 MG
1000 TABLET ORAL ONCE
OUTPATIENT
Start: 2024-05-20 | End: 2024-05-20

## 2024-05-20 RX ORDER — GABAPENTIN 100 MG/1
600 CAPSULE ORAL ONCE
OUTPATIENT
Start: 2024-05-20 | End: 2024-05-20

## 2024-05-20 RX ORDER — SCOLOPAMINE TRANSDERMAL SYSTEM 1 MG/1
1 PATCH, EXTENDED RELEASE TRANSDERMAL CONTINUOUS
OUTPATIENT
Start: 2024-05-20 | End: 2024-05-23

## 2024-06-17 ENCOUNTER — PRE-ADMISSION TESTING (OUTPATIENT)
Dept: PREADMISSION TESTING | Facility: HOSPITAL | Age: 79
End: 2024-06-17
Payer: MEDICARE

## 2024-06-17 VITALS — HEIGHT: 66 IN | BODY MASS INDEX: 36.78 KG/M2 | WEIGHT: 228.84 LBS

## 2024-06-17 LAB
DEPRECATED RDW RBC AUTO: 53.3 FL (ref 37–54)
ERYTHROCYTE [DISTWIDTH] IN BLOOD BY AUTOMATED COUNT: 15.8 % (ref 12.3–15.4)
HBA1C MFR BLD: 9.2 % (ref 4.8–5.6)
HCT VFR BLD AUTO: 26.8 % (ref 37.5–51)
HGB BLD-MCNC: 8.2 G/DL (ref 13–17.7)
MCH RBC QN AUTO: 28.3 PG (ref 26.6–33)
MCHC RBC AUTO-ENTMCNC: 30.6 G/DL (ref 31.5–35.7)
MCV RBC AUTO: 92.4 FL (ref 79–97)
PLATELET # BLD AUTO: 323 10*3/MM3 (ref 140–450)
PMV BLD AUTO: 10.5 FL (ref 6–12)
POTASSIUM SERPL-SCNC: 4.4 MMOL/L (ref 3.5–5.2)
RBC # BLD AUTO: 2.9 10*6/MM3 (ref 4.14–5.8)
WBC NRBC COR # BLD AUTO: 7.12 10*3/MM3 (ref 3.4–10.8)

## 2024-06-17 PROCEDURE — 36415 COLL VENOUS BLD VENIPUNCTURE: CPT

## 2024-06-17 PROCEDURE — 93005 ELECTROCARDIOGRAM TRACING: CPT

## 2024-06-17 PROCEDURE — 83036 HEMOGLOBIN GLYCOSYLATED A1C: CPT

## 2024-06-17 PROCEDURE — 85027 COMPLETE CBC AUTOMATED: CPT

## 2024-06-17 PROCEDURE — 84132 ASSAY OF SERUM POTASSIUM: CPT

## 2024-06-17 RX ORDER — SENNOSIDES 8.6 MG
650 CAPSULE ORAL 2 TIMES DAILY
COMMUNITY

## 2024-06-17 NOTE — PAT
Patient viewed general PAT education video as instructed in their preoperative information received from their surgeon.  Patient stated the general PAT education video was viewed in its entirety and survey completed.  Copies of PAT general education handouts (Incentive Spirometry, Meds to Beds Program, Patient Belongings, Pre-op skin preparation instructions, Blood Glucose testing, Visitor policy, Surgery FAQ, Code H) distributed to patient if not printed. Education related to the PAT pass and skin preparation for surgery (if applicable) completed in PAT as a reinforcement to PAT education video. Patient instructed to return PAT pass provided today as well as completed skin preparation sheet (if applicable) on the day of procedure.     Additionally if patient had not viewed video yet but intended to view it at home or in our waiting area, then referred them to the handout with QR code/link provided during PAT visit.  Instructed patient to complete survey after viewing the video in its entirety.  Encouraged patient/family to read PAT general education handouts thoroughly and notify PAT staff with any questions or concerns. Patient verbalized understanding of all information and priority content.    An arrival time for procedure was not provided during PAT visit. If patient had any questions or concerns about their arrival time, they were instructed to contact their surgeon/physician.  Additionally, if the patient referred to an arrival time that was acquired from their my chart account, patient was encouraged to verify that time with their surgeon/physician. Arrival times are NOT provided in Pre Admission Testing Department.    Patient instructed to drink 20 ounces of Gatorade or Gatorlyte (if diabetic) and it needs to be completed 1 hour (for Main OR patients) or 2 hours (scheduled  section & [facility] patients) before given arrival time for procedure (NO RED Gatorade and NO Gatorade Zero).    Patient verbalized  understanding.    Patient denies any current skin issues.     Per Anesthesia Request, patient instructed not to take their ACE/ARB medications on the AM of surgery.    Post-Surgery Information Instruction Sheet given to patient during Pre-Admission Testing Visit with verbal instructions to patient to return with PAT PASS on the day of surgery. Additionally, encouraged patient to review the information provided.    EKG from PAT today faxed to anesthesiology department for review and cardiac clearance. RN spoke with DR LAZARO  and reviewed pertinent medical history and EKG results.  Per DR LAZARO_, patient is cleared to proceed with procedure as planned without additional cardiac testing. Patient denies chest pain or increased shortness of breath.

## 2024-06-19 LAB
QT INTERVAL: 378 MS
QTC INTERVAL: 425 MS

## 2024-06-23 ENCOUNTER — ANESTHESIA EVENT (OUTPATIENT)
Dept: PERIOP | Facility: HOSPITAL | Age: 79
End: 2024-06-23
Payer: MEDICARE

## 2024-06-23 RX ORDER — SODIUM CHLORIDE 0.9 % (FLUSH) 0.9 %
10 SYRINGE (ML) INJECTION EVERY 12 HOURS SCHEDULED
Status: CANCELLED | OUTPATIENT
Start: 2024-06-23

## 2024-06-23 RX ORDER — SODIUM CHLORIDE 0.9 % (FLUSH) 0.9 %
10 SYRINGE (ML) INJECTION AS NEEDED
Status: CANCELLED | OUTPATIENT
Start: 2024-06-23

## 2024-06-23 RX ORDER — FAMOTIDINE 10 MG/ML
20 INJECTION, SOLUTION INTRAVENOUS ONCE
Status: CANCELLED | OUTPATIENT
Start: 2024-06-23 | End: 2024-06-23

## 2024-06-24 ENCOUNTER — ANESTHESIA (OUTPATIENT)
Dept: PERIOP | Facility: HOSPITAL | Age: 79
End: 2024-06-24
Payer: MEDICARE

## 2024-06-24 ENCOUNTER — HOSPITAL ENCOUNTER (OUTPATIENT)
Facility: HOSPITAL | Age: 79
Setting detail: HOSPITAL OUTPATIENT SURGERY
Discharge: HOME | End: 2024-06-24
Attending: UROLOGY | Admitting: UROLOGY
Payer: MEDICARE

## 2024-06-24 VITALS
RESPIRATION RATE: 16 BRPM | OXYGEN SATURATION: 100 % | HEIGHT: 66 IN | TEMPERATURE: 98.2 F | DIASTOLIC BLOOD PRESSURE: 65 MMHG | HEART RATE: 62 BPM | WEIGHT: 228.84 LBS | BODY MASS INDEX: 36.78 KG/M2 | SYSTOLIC BLOOD PRESSURE: 130 MMHG

## 2024-06-24 DIAGNOSIS — R39.9 LOWER URINARY TRACT SYMPTOMS (LUTS): ICD-10-CM

## 2024-06-24 DIAGNOSIS — N40.1 BENIGN PROSTATIC HYPERPLASIA WITH URINARY OBSTRUCTION: Primary | ICD-10-CM

## 2024-06-24 DIAGNOSIS — N13.8 BENIGN PROSTATIC HYPERPLASIA WITH URINARY OBSTRUCTION: Primary | ICD-10-CM

## 2024-06-24 LAB
DEPRECATED RDW RBC AUTO: 52.9 FL (ref 37–54)
ERYTHROCYTE [DISTWIDTH] IN BLOOD BY AUTOMATED COUNT: 15.8 % (ref 12.3–15.4)
GLUCOSE BLDC GLUCOMTR-MCNC: 195 MG/DL (ref 70–130)
HCT VFR BLD AUTO: 25.7 % (ref 37.5–51)
HGB BLD-MCNC: 8.1 G/DL (ref 13–17.7)
MCH RBC QN AUTO: 28.6 PG (ref 26.6–33)
MCHC RBC AUTO-ENTMCNC: 31.5 G/DL (ref 31.5–35.7)
MCV RBC AUTO: 90.8 FL (ref 79–97)
PLATELET # BLD AUTO: 239 10*3/MM3 (ref 140–450)
PMV BLD AUTO: 11.3 FL (ref 6–12)
RBC # BLD AUTO: 2.83 10*6/MM3 (ref 4.14–5.8)
WBC NRBC COR # BLD AUTO: 6.46 10*3/MM3 (ref 3.4–10.8)

## 2024-06-24 PROCEDURE — 25010000002 CEFAZOLIN PER 500 MG: Performed by: UROLOGY

## 2024-06-24 PROCEDURE — 25010000002 FENTANYL CITRATE (PF) 100 MCG/2ML SOLUTION: Performed by: ANESTHESIOLOGY

## 2024-06-24 PROCEDURE — 25810000003 LACTATED RINGERS PER 1000 ML: Performed by: ANESTHESIOLOGY

## 2024-06-24 PROCEDURE — 25010000002 PROPOFOL 10 MG/ML EMULSION: Performed by: ANESTHESIOLOGY

## 2024-06-24 PROCEDURE — 25010000002 DEXAMETHASONE PER 1 MG: Performed by: ANESTHESIOLOGY

## 2024-06-24 PROCEDURE — 52601 PROSTATECTOMY (TURP): CPT | Performed by: UROLOGY

## 2024-06-24 PROCEDURE — 25010000002 ONDANSETRON PER 1 MG: Performed by: ANESTHESIOLOGY

## 2024-06-24 PROCEDURE — 85027 COMPLETE CBC AUTOMATED: CPT | Performed by: ANESTHESIOLOGY

## 2024-06-24 PROCEDURE — 82948 REAGENT STRIP/BLOOD GLUCOSE: CPT

## 2024-06-24 PROCEDURE — 25010000002 SUGAMMADEX 200 MG/2ML SOLUTION: Performed by: ANESTHESIOLOGY

## 2024-06-24 RX ORDER — MIDAZOLAM HYDROCHLORIDE 1 MG/ML
0.5 INJECTION INTRAMUSCULAR; INTRAVENOUS
Status: DISCONTINUED | OUTPATIENT
Start: 2024-06-24 | End: 2024-06-24 | Stop reason: HOSPADM

## 2024-06-24 RX ORDER — DROPERIDOL 2.5 MG/ML
0.62 INJECTION, SOLUTION INTRAMUSCULAR; INTRAVENOUS ONCE AS NEEDED
Status: DISCONTINUED | OUTPATIENT
Start: 2024-06-24 | End: 2024-06-24 | Stop reason: HOSPADM

## 2024-06-24 RX ORDER — SODIUM CHLORIDE 9 MG/ML
40 INJECTION, SOLUTION INTRAVENOUS AS NEEDED
Status: DISCONTINUED | OUTPATIENT
Start: 2024-06-24 | End: 2024-06-24 | Stop reason: HOSPADM

## 2024-06-24 RX ORDER — PHENAZOPYRIDINE HYDROCHLORIDE 200 MG/1
200 TABLET, FILM COATED ORAL 3 TIMES DAILY PRN
Qty: 20 TABLET | Refills: 0 | Status: SHIPPED | OUTPATIENT
Start: 2024-06-24

## 2024-06-24 RX ORDER — ONDANSETRON 2 MG/ML
4 INJECTION INTRAMUSCULAR; INTRAVENOUS ONCE AS NEEDED
Status: DISCONTINUED | OUTPATIENT
Start: 2024-06-24 | End: 2024-06-24 | Stop reason: HOSPADM

## 2024-06-24 RX ORDER — NALOXONE HCL 0.4 MG/ML
0.4 VIAL (ML) INJECTION AS NEEDED
Status: DISCONTINUED | OUTPATIENT
Start: 2024-06-24 | End: 2024-06-24 | Stop reason: HOSPADM

## 2024-06-24 RX ORDER — PROPOFOL 10 MG/ML
VIAL (ML) INTRAVENOUS AS NEEDED
Status: DISCONTINUED | OUTPATIENT
Start: 2024-06-24 | End: 2024-06-24 | Stop reason: SURG

## 2024-06-24 RX ORDER — LIDOCAINE HYDROCHLORIDE 10 MG/ML
INJECTION, SOLUTION EPIDURAL; INFILTRATION; INTRACAUDAL; PERINEURAL AS NEEDED
Status: DISCONTINUED | OUTPATIENT
Start: 2024-06-24 | End: 2024-06-24 | Stop reason: SURG

## 2024-06-24 RX ORDER — ROCURONIUM BROMIDE 10 MG/ML
INJECTION, SOLUTION INTRAVENOUS AS NEEDED
Status: DISCONTINUED | OUTPATIENT
Start: 2024-06-24 | End: 2024-06-24 | Stop reason: SURG

## 2024-06-24 RX ORDER — GABAPENTIN 300 MG/1
600 CAPSULE ORAL ONCE
Status: COMPLETED | OUTPATIENT
Start: 2024-06-24 | End: 2024-06-24

## 2024-06-24 RX ORDER — SODIUM CHLORIDE 0.9 % (FLUSH) 0.9 %
3 SYRINGE (ML) INJECTION EVERY 12 HOURS SCHEDULED
Status: DISCONTINUED | OUTPATIENT
Start: 2024-06-24 | End: 2024-06-24 | Stop reason: HOSPADM

## 2024-06-24 RX ORDER — FENTANYL CITRATE 50 UG/ML
INJECTION, SOLUTION INTRAMUSCULAR; INTRAVENOUS AS NEEDED
Status: DISCONTINUED | OUTPATIENT
Start: 2024-06-24 | End: 2024-06-24 | Stop reason: SURG

## 2024-06-24 RX ORDER — HYDROCODONE BITARTRATE AND ACETAMINOPHEN 5; 325 MG/1; MG/1
1 TABLET ORAL ONCE AS NEEDED
Status: DISCONTINUED | OUTPATIENT
Start: 2024-06-24 | End: 2024-06-24 | Stop reason: HOSPADM

## 2024-06-24 RX ORDER — PROMETHAZINE HYDROCHLORIDE 25 MG/1
25 TABLET ORAL ONCE AS NEEDED
Status: DISCONTINUED | OUTPATIENT
Start: 2024-06-24 | End: 2024-06-24 | Stop reason: HOSPADM

## 2024-06-24 RX ORDER — SODIUM CHLORIDE 0.9 % (FLUSH) 0.9 %
3-10 SYRINGE (ML) INJECTION AS NEEDED
Status: DISCONTINUED | OUTPATIENT
Start: 2024-06-24 | End: 2024-06-24 | Stop reason: HOSPADM

## 2024-06-24 RX ORDER — DEXAMETHASONE SODIUM PHOSPHATE 4 MG/ML
INJECTION, SOLUTION INTRA-ARTICULAR; INTRALESIONAL; INTRAMUSCULAR; INTRAVENOUS; SOFT TISSUE AS NEEDED
Status: DISCONTINUED | OUTPATIENT
Start: 2024-06-24 | End: 2024-06-24 | Stop reason: SURG

## 2024-06-24 RX ORDER — LABETALOL HYDROCHLORIDE 5 MG/ML
5 INJECTION, SOLUTION INTRAVENOUS
Status: DISCONTINUED | OUTPATIENT
Start: 2024-06-24 | End: 2024-06-24 | Stop reason: HOSPADM

## 2024-06-24 RX ORDER — TAMSULOSIN HYDROCHLORIDE 0.4 MG/1
1 CAPSULE ORAL DAILY
Qty: 14 CAPSULE | Refills: 0 | Status: SHIPPED | OUTPATIENT
Start: 2024-06-24 | End: 2024-07-08

## 2024-06-24 RX ORDER — HYDROMORPHONE HYDROCHLORIDE 1 MG/ML
0.5 INJECTION, SOLUTION INTRAMUSCULAR; INTRAVENOUS; SUBCUTANEOUS
Status: DISCONTINUED | OUTPATIENT
Start: 2024-06-24 | End: 2024-06-24 | Stop reason: HOSPADM

## 2024-06-24 RX ORDER — FAMOTIDINE 20 MG/1
20 TABLET, FILM COATED ORAL ONCE
Status: DISCONTINUED | OUTPATIENT
Start: 2024-06-24 | End: 2024-06-24 | Stop reason: HOSPADM

## 2024-06-24 RX ORDER — HYDRALAZINE HYDROCHLORIDE 20 MG/ML
5 INJECTION INTRAMUSCULAR; INTRAVENOUS
Status: DISCONTINUED | OUTPATIENT
Start: 2024-06-24 | End: 2024-06-24 | Stop reason: HOSPADM

## 2024-06-24 RX ORDER — ONDANSETRON 2 MG/ML
INJECTION INTRAMUSCULAR; INTRAVENOUS AS NEEDED
Status: DISCONTINUED | OUTPATIENT
Start: 2024-06-24 | End: 2024-06-24 | Stop reason: SURG

## 2024-06-24 RX ORDER — SODIUM CHLORIDE, SODIUM LACTATE, POTASSIUM CHLORIDE, CALCIUM CHLORIDE 600; 310; 30; 20 MG/100ML; MG/100ML; MG/100ML; MG/100ML
9 INJECTION, SOLUTION INTRAVENOUS CONTINUOUS
Status: DISCONTINUED | OUTPATIENT
Start: 2024-06-24 | End: 2024-06-24 | Stop reason: HOSPADM

## 2024-06-24 RX ORDER — MAGNESIUM HYDROXIDE 1200 MG/15ML
LIQUID ORAL AS NEEDED
Status: DISCONTINUED | OUTPATIENT
Start: 2024-06-24 | End: 2024-06-24 | Stop reason: HOSPADM

## 2024-06-24 RX ORDER — NITROFURANTOIN 25; 75 MG/1; MG/1
100 CAPSULE ORAL 2 TIMES DAILY
Qty: 14 CAPSULE | Refills: 0 | Status: SHIPPED | OUTPATIENT
Start: 2024-06-24

## 2024-06-24 RX ORDER — OXYBUTYNIN CHLORIDE 5 MG/1
5 TABLET, EXTENDED RELEASE ORAL DAILY PRN
Qty: 14 TABLET | Refills: 0 | Status: SHIPPED | OUTPATIENT
Start: 2024-06-24

## 2024-06-24 RX ORDER — ACETAMINOPHEN 500 MG
1000 TABLET ORAL ONCE
Status: COMPLETED | OUTPATIENT
Start: 2024-06-24 | End: 2024-06-24

## 2024-06-24 RX ORDER — PROMETHAZINE HYDROCHLORIDE 25 MG/1
25 SUPPOSITORY RECTAL ONCE AS NEEDED
Status: DISCONTINUED | OUTPATIENT
Start: 2024-06-24 | End: 2024-06-24 | Stop reason: HOSPADM

## 2024-06-24 RX ORDER — DROPERIDOL 2.5 MG/ML
0.62 INJECTION, SOLUTION INTRAMUSCULAR; INTRAVENOUS
Status: DISCONTINUED | OUTPATIENT
Start: 2024-06-24 | End: 2024-06-24 | Stop reason: HOSPADM

## 2024-06-24 RX ORDER — IPRATROPIUM BROMIDE AND ALBUTEROL SULFATE 2.5; .5 MG/3ML; MG/3ML
3 SOLUTION RESPIRATORY (INHALATION) ONCE AS NEEDED
Status: DISCONTINUED | OUTPATIENT
Start: 2024-06-24 | End: 2024-06-24 | Stop reason: HOSPADM

## 2024-06-24 RX ORDER — FENTANYL CITRATE 50 UG/ML
50 INJECTION, SOLUTION INTRAMUSCULAR; INTRAVENOUS
Status: DISCONTINUED | OUTPATIENT
Start: 2024-06-24 | End: 2024-06-24 | Stop reason: HOSPADM

## 2024-06-24 RX ORDER — LIDOCAINE HYDROCHLORIDE 10 MG/ML
0.5 INJECTION, SOLUTION EPIDURAL; INFILTRATION; INTRACAUDAL; PERINEURAL ONCE AS NEEDED
Status: DISCONTINUED | OUTPATIENT
Start: 2024-06-24 | End: 2024-06-24 | Stop reason: HOSPADM

## 2024-06-24 RX ADMIN — ROCURONIUM BROMIDE 40 MG: 10 INJECTION INTRAVENOUS at 13:17

## 2024-06-24 RX ADMIN — ONDANSETRON 4 MG: 2 INJECTION INTRAMUSCULAR; INTRAVENOUS at 14:25

## 2024-06-24 RX ADMIN — FENTANYL CITRATE 25 MCG: 50 INJECTION, SOLUTION INTRAMUSCULAR; INTRAVENOUS at 13:17

## 2024-06-24 RX ADMIN — SODIUM CHLORIDE, POTASSIUM CHLORIDE, SODIUM LACTATE AND CALCIUM CHLORIDE: 600; 310; 30; 20 INJECTION, SOLUTION INTRAVENOUS at 13:10

## 2024-06-24 RX ADMIN — PROPOFOL 100 MG: 10 INJECTION, EMULSION INTRAVENOUS at 13:17

## 2024-06-24 RX ADMIN — FENTANYL CITRATE 25 MCG: 50 INJECTION, SOLUTION INTRAMUSCULAR; INTRAVENOUS at 14:09

## 2024-06-24 RX ADMIN — GABAPENTIN 600 MG: 300 CAPSULE ORAL at 11:47

## 2024-06-24 RX ADMIN — ROCURONIUM BROMIDE 10 MG: 10 INJECTION INTRAVENOUS at 13:23

## 2024-06-24 RX ADMIN — DEXAMETHASONE SODIUM PHOSPHATE 4 MG: 4 INJECTION INTRA-ARTICULAR; INTRALESIONAL; INTRAMUSCULAR; INTRAVENOUS; SOFT TISSUE at 13:23

## 2024-06-24 RX ADMIN — PROPOFOL 20 MG: 10 INJECTION, EMULSION INTRAVENOUS at 14:11

## 2024-06-24 RX ADMIN — FENTANYL CITRATE 50 MCG: 50 INJECTION, SOLUTION INTRAMUSCULAR; INTRAVENOUS at 14:32

## 2024-06-24 RX ADMIN — ACETAMINOPHEN 1000 MG: 500 TABLET ORAL at 11:47

## 2024-06-24 RX ADMIN — SODIUM CHLORIDE 2000 MG: 900 INJECTION INTRAVENOUS at 13:24

## 2024-06-24 RX ADMIN — ROCURONIUM BROMIDE 20 MG: 10 INJECTION INTRAVENOUS at 13:39

## 2024-06-24 RX ADMIN — LIDOCAINE HYDROCHLORIDE 50 MG: 10 INJECTION, SOLUTION EPIDURAL; INFILTRATION; INTRACAUDAL; PERINEURAL at 13:17

## 2024-06-24 RX ADMIN — SUGAMMADEX 200 MG: 100 INJECTION, SOLUTION INTRAVENOUS at 14:25

## 2024-06-24 NOTE — OP NOTE
Transurethral Resection of Prostate Procedure Report    Patient Name:  Paul Shook  YOB: 1945    Date of Surgery:  6/24/2024     Indications:  78 male with prostatic hyperplasia and associated lower urinary tract symptoms.  Patient has undergone previous UroLift procedure approximately 1 year ago at outside facility.  He continues to have reported urinary symptoms.  The patient has undergone anatomic evaluation with cystoscopy and TRUS for volume demonstrated continued lateral lobe coaptation within the prostate..  The patient presents today for  transurethral greenlight PVP.  Affected perioperative and postoperative course discussed.  All risk benefits and alternatives discussed with the patient and patient elects to proceed.    Pre-op Diagnosis:   Hyperplasia of the prostate with lower urinary tract symptoms    Post-op Diagnosis:   Hyperplasia of the prostate with lower urinary tract symptoms    Procedure:  Transurethral Resection of Prostate  Spivey catheter insertion     Staff:  Iglesia Tesfaye    Anesthesia: GEN    Estimated Blood Loss: minimal    Specimen:          None    Findings:   Normal pendulous, bulbar urethra.  Within the prostatic urethra there was a lateral lobe coaptation, median bar  Performance of greenlight PVP with creation of appropriate anterior channel.  Hemostasis achieved.  Indwelling UroLift tines then visualized within the lateral lobe tissue.  Resectoscope inserted with tines resected and removed.  Hemostasis achieved.  Insertion of 22 Gambian two-way Spivey catheter    Complications: None immediate    Description of Procedure:   After informed consent was obtained, the patient was taken to the operating room and placed supine on the operating table and general anesthesia was induced. The patient was positioned to the dorsal lithotomy position and prepped and draped in a standard sterile fashion.     Preoperative antibiotics were given and a multidisciplinary  time-out was performed. Using Nguyen sounds, the urethra was dilated to 30-Cape Verdean.     Next, a continuous flow resectoscope with a visual obturator was advanced through the urethra and into the bladder. The prostatic urethra was notable for lateral lobe coaptation, median bar.     Panendoscopy revealed  no bladder abnormalities and both ureteral orifices were identified.      The visual obturator was removed and replaced with a laser bridge. A Greenlight laser fiber was advanced through the working channel of the scope, and two vaporization incisions were made at the 5 and 7 o'clock positions to the bladder neck, and then taken down to just proximal to the verumontanum, sparing the urethral sphincter.    The median lobe was completely ablated/vaporized to the prostatic capsule avoiding undermining the bladder neck.     Next, the right lateral lobe was vaporized to the prostatic capsule, followed by the left. Bleeding vessels and venous channels encountered were controlled with coagulation settings on the laser.     Once greenlight resection was complete there was identified UroLift tines bilaterally within the lateral lobe tissue.  A 26 [place] Verdean resectoscope was inserted.  The tines were resected and all pieces removed.  Hemostasis achieved within our entire resection bed.    There was no significant prostatic specimen available to send to pathology after completion of vaporization/section. The bladder was emptied. 20 cc of lidocaine gel was instilled into the urethra for analgesia. . A 22 Fr 2-way corbin catheter was then inserted into the bladder without difficulty and 30 ml of sterile water was used to fill the balloon. The catheter was secured.     The patient was brought to PACU in stable condition. The patient tolerated the procedure well and there were no immediate complications.    Disposition: The patient will be discharged from PACU when meeting criteria.  The patient will be discharged with Corbin catheter  in place.  The patient will follow-up in 48 hours for catheter removal and voiding trial.  The patient will follow-up in 2 weeks for a symptom check.    Iglesia Tesfaye MD     Date: 6/24/2024  Time: 14:43 EDT

## 2024-06-24 NOTE — ANESTHESIA PREPROCEDURE EVALUATION
Anesthesia Evaluation     Patient summary reviewed and Nursing notes reviewed   NPO Solid Status: > 8 hours  NPO Liquid Status: > 2 hours           Airway   Mallampati: II  TM distance: >3 FB  Neck ROM: full  No difficulty expected  Dental - normal exam     Pulmonary - normal exam    breath sounds clear to auscultation  Cardiovascular - normal exam    ECG reviewed  Rhythm: regular  Rate: normal    (+) hypertension, hyperlipidemia  (-) past MI, CHF      Neuro/Psych  (+) psychiatric history  GI/Hepatic/Renal/Endo    (+) obesity, renal disease-, diabetes mellitus type 2  (-) GERD    Musculoskeletal     Abdominal  - normal exam    Bowel sounds: normal.   Substance History      OB/GYN          Other      history of cancer                  Anesthesia Plan    ASA 3     general     intravenous induction     Anesthetic plan, risks, benefits, and alternatives have been provided, discussed and informed consent has been obtained with: patient.    Plan discussed with CRNA.    CODE STATUS:

## 2024-06-24 NOTE — ANESTHESIA POSTPROCEDURE EVALUATION
Patient: Paul Shook    Procedure Summary       Date: 06/24/24 Room / Location:  REAL OR 07 /  REAL OR    Anesthesia Start: 1310 Anesthesia Stop: 1440    Procedure: CYSTOSCOPY TRANSURETHRAL RESECTION OF PROSTATE GREENLIGHT Diagnosis:       Lower urinary tract symptoms (LUTS)      Benign prostatic hyperplasia with urinary obstruction      (Lower urinary tract symptoms (LUTS) [R39.9])      (Benign prostatic hyperplasia with urinary obstruction [N40.1, N13.8])    Surgeons: Iglesia Tesfaye MD Provider: Diaz Jaeger MD    Anesthesia Type: general ASA Status: 3            Anesthesia Type: general    Vitals  Vitals Value Taken Time   /63 06/24/24 1440   Temp 97 °F (36.1 °C) 06/24/24 1440   Pulse 68 06/24/24 1440   Resp 12 06/24/24 1440   SpO2 100 % 06/24/24 1440           Post Anesthesia Care and Evaluation    Patient location during evaluation: PACU  Patient participation: complete - patient participated  Level of consciousness: awake and alert  Pain management: adequate    Airway patency: patent  Anesthetic complications: No anesthetic complications  PONV Status: none  Cardiovascular status: hemodynamically stable and acceptable  Respiratory status: nonlabored ventilation, acceptable and nasal cannula  Hydration status: acceptable
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
## 2024-06-24 NOTE — DISCHARGE INSTRUCTIONS
Laser Ablation of Prostate Post-Operative Care/Expectations     Please follow these guidelines after your procedure in order to assist with your recovery.     Anesthesia Precautions and Expectations  - Rest for 24 hours after receiving general anesthesia, make sure you have someone at home with you that can monitor you  - Do not operate a vehicle, drink alcohol, or make 'important decisions'/sign legal documentation during the immediate recovery period if you received sedation for your procedure  - You may experience a sore throat, jaw discomfort, or muscle aches related to anesthesia, these symptoms may last a few days    Activity  - Light activity is encouraged for 1 week to prevent urinary bleeding  - Avoid lifting heavy objects more than 20 lbs, running, or endurance exercise for 1 week   - Do not operate a vehicle or drink alcohol if you were prescribed narcotic pain medications     Bathing/Showering  - You may resume normal bathing and showering post-procedure    Pain/Urinary Symptoms  - You may experience burning urinary pain for a few days, and/or increased urinary urgency/frequency post-procedure for a few weeks which is expected  - A medication to treat burning urinary pain (Phenazopyridine) may be prescribed by your doctor, take as directed  - A medication to prevent urinary urgency/frequency (sometimes referred to as “bladder spasms”) (Hyoscyamine, Oxybutynin, Mirabegron, Solifenacin, etc.) may be prescribed by your doctor for up to 1 month, take as directed     Urinary Bleeding (Hematuria)   - Some degree of light urinary bleeding (hematuria) is expected for up to 1-2 weeks (this may be as light as pink lemonade or somewhat darker like clear/pale red Gatorade); a good rule of thumb is that your urine should remain see-through    - If you experience heavy urinary bleeding (like the color and consistency of tomato juice, or red wine), large blood clots, or you are unable to urinate for more than 8 hours  you should contact your doctor and present to the nearest Emergency Department  - Drink plenty of water at home and stay hydrated, as this will help naturally flush out your bladder and urethra    Sexual Activity  - Refrain from sexual activity and ejaculation for 1 week while you are healing which may help prevent pain and bleeding    Spivey Catheter   - You may be sent home with a urethral catheter sometimes for up to 1 week post-procedure; catheter specific instructions are as follows:   - Do not attempt to remove your urinary catheter (unless explicitly instructed by your doctor with at home catheter removal instructions/equipment)  - If you feel that your catheter is not working the right way, call your doctor   - Keep your skin and catheter clean, clean the skin around your catheter at least two times each day, clean your skin  and catheter after every bowel movement  - Always keep your urine collection bag below the level of your bladder; keeping the bag below your bladder will help keep your urine from flowing back into your bladder from urine collection bag, which may cause infection     - Drink plenty of liquids, at least 6-8 glasses of healthy liquids or water each day which will help flush out your bladder  - Do not attempt sexual intercourse while you have a catheter in place  - Do not tug or pull on your catheter tubing. This can cause you to bleed and hurt your urethra. Do not step on the tubing when you walk. Always keep the catheter secured to your inner leg with either leg-tape provided, the special catheter sticker/securing device, or leg strap            When to Call Your Doctor  - Severe pain not controlled by oral medications  - Temperature above 101 F degrees  - Severe urinary bleeding or large blood clots in urine  - Inability to urinate for more than 8 hours post-surgery

## 2024-06-26 ENCOUNTER — CLINICAL SUPPORT (OUTPATIENT)
Dept: UROLOGY | Facility: CLINIC | Age: 79
End: 2024-06-26
Payer: MEDICARE

## 2024-06-26 NOTE — PROGRESS NOTES
PT presented to our office for corbin catheter removal and voiding trial. PT is accompanied by step daughter. PT's corbin catheter was draining orange urine without Clots. I first explained to the PT what I will be doing throughout the voiding trial and answered any questions PT and family may have. Then I removed the bag from the catheter and using a piston syringe, inserted 160 mL of sterile water into the PT's bladder. When PT notified me they began to feel a strong urge to urinate, I deflated the catheter balloon holding 30 mL of water. Once the balloon was empty, I removed the catheter in a smooth and gentle movement. I instructed the PT to try and urinate into the hand-held urinal . PT was able to void 125 mL of red urine. PT tolerated well. I advised PT and family to drink lots of water, rest, call us if they are suddenly unable to urinate, or if large amounts of blood/clots are suddenly in urine. PT verbalized understanding.

## 2024-07-09 ENCOUNTER — OFFICE VISIT (OUTPATIENT)
Dept: UROLOGY | Facility: CLINIC | Age: 79
End: 2024-07-09
Payer: MEDICARE

## 2024-07-09 DIAGNOSIS — R39.9 LOWER URINARY TRACT SYMPTOMS (LUTS): Primary | ICD-10-CM

## 2024-07-09 NOTE — PROGRESS NOTES
Follow Up Office Visit      Patient Name: Paul Shook  : 1945   MRN: [id number]     Chief Complaint:    Chief Complaint   Patient presents with    Lower urinary tract symptoms (LUTS)       History of Present Illness: Paul Shook is a 79 y.o. male who presents today for 2-week follow-up after greenlight PVP.  Doing well in the postoperative setting.  He is having current reduction in storage and voiding symptoms.  Denies significant dysuria or hematuria.    Subjective      Review of System: Review of Systems   Genitourinary:  Negative for decreased urine volume, difficulty urinating, dysuria, enuresis, flank pain, frequency, hematuria and urgency.      I have reviewed the ROS documented by my clinical staff, updated as appropriate and I agree. Iglesia Tesfaye MD    I have reviewed and the following portions of the patient's history were updated as appropriate: past family history, past medical history, past social history, past surgical history and problem list.    Medications:     Current Outpatient Medications:     acetaminophen (TYLENOL) 650 MG 8 hr tablet, Take 1 tablet by mouth 2 (Two) Times a Day., Disp: , Rfl:     amLODIPine-valsartan (Exforge) 5-320 MG per tablet, Take 1 tablet by mouth Daily., Disp: 90 tablet, Rfl: 1    APPLE CIDER VINEGAR PO, Take 1 tablet by mouth 2 (Two) Times a Day. 480MG, Disp: , Rfl:     aspirin 81 MG EC tablet, Take 1 tablet by mouth Daily., Disp: 90 tablet, Rfl: 1    Cholecalciferol 100 MCG (4000 UT) capsule, Take 1 tablet by mouth Daily., Disp: , Rfl:     Evolocumab (Repatha SureClick) solution auto-injector SureClick injection, Inject 1 mL under the skin into the appropriate area as directed Every 14 (Fourteen) Days., Disp: 6 mL, Rfl: 1    glucose blood test strip, 1 each by Other route Daily. Use as instructed  frestyle lite strips, Disp: , Rfl:     ketorolac (ACULAR) 0.5 % ophthalmic solution, Administer 1 drop to both eyes Daily., Disp: , Rfl:      nitrofurantoin, macrocrystal-monohydrate, (Macrobid) 100 MG capsule, Take 1 capsule by mouth 2 (Two) Times a Day., Disp: 14 capsule, Rfl: 0    oxybutynin XL (DITROPAN-XL) 5 MG 24 hr tablet, Take 1 tablet by mouth Daily As Needed for bladder spasm, Disp: 14 tablet, Rfl: 0    phenazopyridine (Pyridium) 200 MG tablet, Take 1 tablet by mouth 3 (Three) Times a Day As Needed for Bladder Spasms., Disp: 20 tablet, Rfl: 0    pioglitazone (ACTOS) 45 MG tablet, Take 1 tablet by mouth Daily., Disp: 90 tablet, Rfl: 1    Potassium 99 MG tablet, Take 1 tablet by mouth 3 (Three) Times a Week. MON/ WED/ FRIDAY, Disp: , Rfl:     tamsulosin (FLOMAX) 0.4 MG capsule 24 hr capsule, 1 capsule Every Night., Disp: , Rfl:     torsemide (DEMADEX) 20 MG tablet, Take 1 tablet by mouth Daily. For blood pressure and swelling (Patient taking differently: Take 1 tablet by mouth 3 (Three) Times a Week. MONDAY/ WED/ FRIDAY), Disp: 90 tablet, Rfl: 1    vitamin B-12 (CYANOCOBALAMIN) 1000 MCG tablet, Take 1 tablet by mouth Every Night., Disp: , Rfl:     Allergies:   Allergies   Allergen Reactions    Lisinopril Cough       IPSS Questionnaire (AUA-7):  Over the past month…    1)  Incomplete Emptying  How often have you had a sensation of not emptying your bladder?  2 - Less than half the time   2)  Frequency  How often have you had to urinate less than every two hours? 4 - More than half the time   3)  Intermittency  How often have you found you stopped and started again several times when you urinated?  2 - Less than half the time   4) Urgency  How often have you found it difficult to postpone urination?  4 - More than half the time   5) Weak Stream  How often have you had a weak urinary stream?  1 - Less than 1 time in 5   6) Straining  How often have you had to push or strain to begin urination?  0 - Not at all   7) Nocturia  How many times did you typically get up at night to urinate?  5 - 5+ times   Total Score:  18       Quality of life due to  urinary symptoms:  If you were to spend the rest of your life with your urinary condition the way it is now, how would you feel about that? 2-Mostly Satisfied   Urine Leakage (Incontinence) 0-No Leakage         Objective     Physical Exam:   Vital Signs: There were no vitals filed for this visit.  There is no height or weight on file to calculate BMI.     Physical Exam  Vitals and nursing note reviewed.   Constitutional:       Appearance: Normal appearance.   HENT:      Head: Normocephalic and atraumatic.   Cardiovascular:      Comments: Well perfused  Pulmonary:      Effort: Pulmonary effort is normal.   Abdominal:      General: Abdomen is flat.      Palpations: Abdomen is soft.   Musculoskeletal:         General: Normal range of motion.   Skin:     General: Skin is warm and dry.   Neurological:      General: No focal deficit present.      Mental Status: He is alert and oriented to person, place, and time. Mental status is at baseline.   Psychiatric:         Mood and Affect: Mood normal.         Behavior: Behavior normal.         Thought Content: Thought content normal.         Judgment: Judgment normal.           Labs:   Brief Urine Lab Results       None                 Lab Results   Component Value Date    GLUCOSE 90 07/10/2023    CALCIUM 8.8 07/10/2023     07/10/2023    K 4.4 06/17/2024    CO2 22 07/10/2023     07/10/2023    BUN 29 (H) 07/10/2023    CREATININE 1.95 (H) 07/10/2023    BCR 15 07/10/2023       Lab Results   Component Value Date    WBC 6.46 06/24/2024    HGB 8.1 (L) 06/24/2024    HCT 25.7 (L) 06/24/2024    MCV 90.8 06/24/2024     06/24/2024       Images:   No Images in the past 120 days found..    Measures:   Tobacco:   Paul Shook  reports that he has never smoked. He has never been exposed to tobacco smoke. He has never used smokeless tobacco. I have educated him on the risk of diseases from using tobacco products.   Assessment / Plan      Assessment/Plan:   79 y.o.  male is seen today for 2-week postoperative follow-up after greenlight PVP.  Doing well in the postoperative setting with reduction in storage and voiding symptoms.  Denies current dysuria or hematuria.  Follow-up in 3 months for symptom check.  We have discussed continued expectation for improvement in symptoms in the post surgery setting..     Diagnoses and all orders for this visit:    1. Lower urinary tract symptoms (LUTS) (Primary)         Follow Up:   Return in about 3 months (around 10/9/2024) for Recheck.     I spent approximately 20 minutes providing clinical care for this patient; including review of patient's chart and provider documentation, face to face time spent with patient in examination room (obtaining history, performing physical exam, discussing diagnosis and management options), placing orders, and completing patient documentation.     Iglesia Tesfaye MD  [facility] [place]

## 2024-10-09 ENCOUNTER — OFFICE VISIT (OUTPATIENT)
Dept: UROLOGY | Facility: CLINIC | Age: 79
End: 2024-10-09
Payer: MEDICARE

## 2024-10-09 DIAGNOSIS — N40.1 BENIGN PROSTATIC HYPERPLASIA WITH URINARY OBSTRUCTION: Primary | ICD-10-CM

## 2024-10-09 DIAGNOSIS — N32.81 OAB (OVERACTIVE BLADDER): ICD-10-CM

## 2024-10-09 DIAGNOSIS — R39.9 LOWER URINARY TRACT SYMPTOMS (LUTS): ICD-10-CM

## 2024-10-09 DIAGNOSIS — N13.8 BENIGN PROSTATIC HYPERPLASIA WITH URINARY OBSTRUCTION: Primary | ICD-10-CM

## 2024-10-09 NOTE — PROGRESS NOTES
Follow Up Office Visit      Patient Name: Paul Shook  : 1945   MRN: [id number]     Chief Complaint:    Chief Complaint   Patient presents with    Lower urinary tract symptoms (LUTS)       History of Present Illness: Paul Shook is a 79 y.o. male who presents today for a month postoperative follow-up after greenlight PVP for management of hyperplasia of the prostate and associated lower urinary tract symptoms.  He is doing well in the postoperative setting.  He has had reduction in storage and voiding symptoms.  Denies current dysuria or hematuria.    Subjective      Review of System: Review of Systems   Genitourinary:  Negative for decreased urine volume, difficulty urinating, dysuria, enuresis, flank pain, frequency, hematuria and urgency.      I have reviewed the ROS documented by my clinical staff, updated as appropriate and I agree. Iglesia Tesfaye MD    I have reviewed and the following portions of the patient's history were updated as appropriate: past family history, past medical history, past social history, past surgical history and problem list.    Medications:     Current Outpatient Medications:     acetaminophen (TYLENOL) 650 MG 8 hr tablet, Take 1 tablet by mouth 2 (Two) Times a Day., Disp: , Rfl:     amLODIPine-valsartan (Exforge) 5-320 MG per tablet, Take 1 tablet by mouth Daily., Disp: 90 tablet, Rfl: 1    APPLE CIDER VINEGAR PO, Take 1 tablet by mouth 2 (Two) Times a Day. 480MG, Disp: , Rfl:     aspirin 81 MG EC tablet, Take 1 tablet by mouth Daily., Disp: 90 tablet, Rfl: 1    Cholecalciferol 100 MCG (4000 UT) capsule, Take 1 tablet by mouth Daily., Disp: , Rfl:     Evolocumab (Repatha SureClick) solution auto-injector SureClick injection, Inject 1 mL under the skin into the appropriate area as directed Every 14 (Fourteen) Days., Disp: 6 mL, Rfl: 1    glucose blood test strip, 1 each by Other route Daily. Use as instructed  frestyle lite strips, Disp: , Rfl:      ketorolac (ACULAR) 0.5 % ophthalmic solution, Administer 1 drop to both eyes Daily., Disp: , Rfl:     pioglitazone (ACTOS) 45 MG tablet, Take 1 tablet by mouth Daily., Disp: 90 tablet, Rfl: 1    Potassium 99 MG tablet, Take 1 tablet by mouth 3 (Three) Times a Week. MON/ WED/ FRIDAY, Disp: , Rfl:     tamsulosin (FLOMAX) 0.4 MG capsule 24 hr capsule, 1 capsule Every Night., Disp: , Rfl:     torsemide (DEMADEX) 20 MG tablet, Take 1 tablet by mouth Daily. For blood pressure and swelling (Patient taking differently: Take 1 tablet by mouth 3 (Three) Times a Week. MONDAY/ WED/ FRIDAY), Disp: 90 tablet, Rfl: 1    vitamin B-12 (CYANOCOBALAMIN) 1000 MCG tablet, Take 1 tablet by mouth Every Night., Disp: , Rfl:     nitrofurantoin, macrocrystal-monohydrate, (Macrobid) 100 MG capsule, Take 1 capsule by mouth 2 (Two) Times a Day. (Patient not taking: Reported on 10/9/2024), Disp: 14 capsule, Rfl: 0    oxybutynin XL (DITROPAN-XL) 5 MG 24 hr tablet, Take 1 tablet by mouth Daily As Needed for bladder spasm (Patient not taking: Reported on 10/9/2024), Disp: 14 tablet, Rfl: 0    phenazopyridine (Pyridium) 200 MG tablet, Take 1 tablet by mouth 3 (Three) Times a Day As Needed for Bladder Spasms. (Patient not taking: Reported on 10/9/2024), Disp: 20 tablet, Rfl: 0    Vibegron 75 MG tablet, Take 1 tablet by mouth Daily., Disp: 30 tablet, Rfl: 0    Allergies:   Allergies   Allergen Reactions    Lisinopril Cough       IPSS Questionnaire (AUA-7):  Over the past month…    1)  Incomplete Emptying  How often have you had a sensation of not emptying your bladder?  3 - About half the time   2)  Frequency  How often have you had to urinate less than every two hours? 4 - More than half the time   3)  Intermittency  How often have you found you stopped and started again several times when you urinated?  0 - Not at all   4) Urgency  How often have you found it difficult to postpone urination?  5 - Almost always   5) Weak Stream  How often have you  had a weak urinary stream?  0 - Not at all   6) Straining  How often have you had to push or strain to begin urination?  0 - Not at all   7) Nocturia  How many times did you typically get up at night to urinate?  4 - 4 times   Total Score:  16       Quality of life due to urinary symptoms:  If you were to spend the rest of your life with your urinary condition the way it is now, how would you feel about that? 4-Mostly Dissatisfied   Urine Leakage (Incontinence) 0-No Leakage       Objective     Physical Exam:   Vital Signs: There were no vitals filed for this visit.  There is no height or weight on file to calculate BMI.     Physical Exam  Vitals and nursing note reviewed.   Constitutional:       Appearance: Normal appearance.   HENT:      Head: Normocephalic and atraumatic.   Cardiovascular:      Comments: Well perfused  Pulmonary:      Effort: Pulmonary effort is normal.   Abdominal:      General: Abdomen is flat.      Palpations: Abdomen is soft.   Musculoskeletal:         General: Normal range of motion.   Skin:     General: Skin is warm and dry.   Neurological:      General: No focal deficit present.      Mental Status: He is alert and oriented to person, place, and time. Mental status is at baseline.   Psychiatric:         Mood and Affect: Mood normal.         Behavior: Behavior normal.         Thought Content: Thought content normal.         Judgment: Judgment normal.             Labs:   Brief Urine Lab Results       None                 Lab Results   Component Value Date    GLUCOSE 90 07/10/2023    CALCIUM 8.8 07/10/2023     07/10/2023    K 4.4 06/17/2024    CO2 22 07/10/2023     07/10/2023    BUN 29 (H) 07/10/2023    CREATININE 1.95 (H) 07/10/2023    BCR 15 07/10/2023       Lab Results   Component Value Date    WBC 6.46 06/24/2024    HGB 8.1 (L) 06/24/2024    HCT 25.7 (L) 06/24/2024    MCV 90.8 06/24/2024     06/24/2024       Images:   No Images in the past 120 days found..    Measures:    Tobacco:   Paul Shook  reports that he has never smoked. He has never been exposed to tobacco smoke. He has never used smokeless tobacco. I have educated him on the risk of diseases from using tobacco products.  Assessment / Plan      Assessment/Plan:   79 y.o. male is seen today for month follow-up after greenlight P for management of hyperplasia of prostate and associated urinary symptoms.  Overall he has had significant improvement in obstructive symptoms.  Continues to have mild irritative symptoms.  Today we have discussed consideration to initiate beta 3 agonist.  We will provide prescriptions today for Gemtesa 75 mg.  We will monitor for improvement in irritative symptoms.  Overall he is pleased with symptomatic improvement.  Follow-up in 6 months for symptom check..     Diagnoses and all orders for this visit:    1. Benign prostatic hyperplasia with urinary obstruction (Primary)    2. Lower urinary tract symptoms (LUTS)    3. OAB (overactive bladder)  -     Vibegron 75 MG tablet; Take 1 tablet by mouth Daily.  Dispense: 30 tablet; Refill: 0         Follow Up:   Return in about 6 months (around 4/9/2025) for Recheck.        Iglesia Tesfaye MD  [facility] [place]

## 2024-10-10 PROBLEM — N32.81 OAB (OVERACTIVE BLADDER): Status: ACTIVE | Noted: 2024-10-10

## 2024-11-16 NOTE — PROGRESS NOTES
Post-Operative Note, C/S  She is a  39y woman who is now post-operative day: 4    Subjective:  The patient feels well.  She is ambulating.   She is tolerating regular diet.  She denies nausea and vomiting; denies fever.  She is voiding.  Her pain is controlled; incisional pain is appropriate.  She reports normal postpartum bleeding.  She is breastfeeding.  She is formula feeding.    Physical exam:    Vital Signs Last 24 Hrs  T(C): 36.5 (16 Nov 2024 10:08), Max: 36.8 (15 Nov 2024 17:36)  T(F): 97.7 (16 Nov 2024 10:08), Max: 98.2 (15 Nov 2024 17:36)  HR: 79 (16 Nov 2024 10:08) (75 - 90)  BP: 100/70 (16 Nov 2024 10:08) (100/70 - 123/75)  BP(mean): --  RR: 18 (16 Nov 2024 10:08) (18 - 18)  SpO2: 99% (16 Nov 2024 10:08) (98% - 100%)    Parameters below as of 16 Nov 2024 10:08  Patient On (Oxygen Delivery Method): room air        Gen: NAD  Breast: Soft, nontender, not engorged.  Abdomen: Soft, nontender, no distension , firm uterine fundus at umbilicus.  Incision: C/D/I.  Pelvic: Normal lochia noted  Ext: No calf tenderness    LABS:                        9.9    11.50 )-----------( 354      ( 15 Nov 2024 05:35 )             30.1       Rubella status:     Allergies    No Known Allergies    Intolerances      MEDICATIONS  (STANDING):  acetaminophen     Tablet .. 975 milliGRAM(s) Oral <User Schedule>  ascorbic acid 500 milliGRAM(s) Oral daily  diphtheria/tetanus/pertussis (acellular) Vaccine (Adacel) 0.5 milliLiter(s) IntraMuscular once  ferrous    sulfate 325 milliGRAM(s) Oral daily  heparin   Injectable 5000 Unit(s) SubCutaneous every 12 hours  ibuprofen  Tablet. 600 milliGRAM(s) Oral every 6 hours  influenza   Vaccine 0.5 milliLiter(s) IntraMuscular once    MEDICATIONS  (PRN):  diphenhydrAMINE 25 milliGRAM(s) Oral every 6 hours PRN Pruritus  lanolin Ointment 1 Application(s) Topical every 6 hours PRN Sore Nipples  magnesium hydroxide Suspension 30 milliLiter(s) Oral two times a day PRN Constipation  oxyCODONE    IR 5 milliGRAM(s) Oral every 3 hours PRN Moderate to Severe Pain (4-10)  oxyCODONE    IR 5 milliGRAM(s) Oral once PRN Moderate to Severe Pain (4-10)  senna 1 Tablet(s) Oral two times a day PRN Constipation  simethicone 80 milliGRAM(s) Chew every 4 hours PRN Gas             The message below may be given by the hub:    Please contact patient with [employer] lab result message.    There is a lab result message attached to their lab results... but I received notification that the results were not viewed within 5 days on [employer].  I need to make sure that they get their lab result message.    Thank you.

## 2025-05-09 ENCOUNTER — TRANSCRIBE ORDERS (OUTPATIENT)
Dept: CARDIOLOGY | Facility: CLINIC | Age: 80
End: 2025-05-09
Payer: MEDICARE

## 2025-05-09 ENCOUNTER — TELEPHONE (OUTPATIENT)
Dept: CARDIOLOGY | Facility: CLINIC | Age: 80
End: 2025-05-09
Payer: MEDICARE

## 2025-05-09 DIAGNOSIS — I12.9 HYPERTENSION, RENAL DISEASE, STAGE 1-4 OR UNSPECIFIED CHRONIC KIDNEY DISEASE: Primary | ICD-10-CM

## 2025-05-09 NOTE — TELEPHONE ENCOUNTER
UNABLE TO LVM FOR PT TO CALL BACK AND SCHEDULE REFERRAL    HUB CAN SCHEDULE EARLIEST AVAILABLE APPOINTMENT

## 2025-05-16 ENCOUNTER — TELEPHONE (OUTPATIENT)
Dept: CARDIOLOGY | Facility: CLINIC | Age: 80
End: 2025-05-16
Payer: MEDICARE

## 2025-05-19 ENCOUNTER — TELEPHONE (OUTPATIENT)
Dept: CARDIOLOGY | Facility: CLINIC | Age: 80
End: 2025-05-19
Payer: MEDICARE

## 2025-05-29 ENCOUNTER — OFFICE VISIT (OUTPATIENT)
Dept: CARDIOLOGY | Facility: CLINIC | Age: 80
End: 2025-05-29
Payer: MEDICARE

## 2025-05-29 VITALS
SYSTOLIC BLOOD PRESSURE: 154 MMHG | RESPIRATION RATE: 18 BRPM | HEIGHT: 66 IN | DIASTOLIC BLOOD PRESSURE: 92 MMHG | BODY MASS INDEX: 39.37 KG/M2 | HEART RATE: 85 BPM | OXYGEN SATURATION: 98 % | WEIGHT: 245 LBS

## 2025-05-29 DIAGNOSIS — I10 HYPERTENSION, ESSENTIAL: Chronic | ICD-10-CM

## 2025-05-29 DIAGNOSIS — E78.2 MIXED HYPERLIPIDEMIA: ICD-10-CM

## 2025-05-29 DIAGNOSIS — I50.9 OTHER CONGESTIVE HEART FAILURE: Primary | ICD-10-CM

## 2025-05-29 RX ORDER — AMLODIPINE AND VALSARTAN 5; 320 MG/1; MG/1
1 TABLET ORAL DAILY
Qty: 30 TABLET | Refills: 2 | Status: SHIPPED | OUTPATIENT
Start: 2025-05-29

## 2025-05-29 RX ORDER — DOCUSATE SODIUM 100 MG/1
100 CAPSULE, LIQUID FILLED ORAL 2 TIMES DAILY
COMMUNITY

## 2025-05-29 RX ORDER — GLIPIZIDE 2.5 MG/1
2.5 TABLET ORAL DAILY
COMMUNITY

## 2025-05-29 NOTE — ASSESSMENT & PLAN NOTE
Lipid abnormalities are improving with treatment    Plan:  Continue same medication/s without change.  Repatha 140 mg subcutaneously every 2 weeks    Discussed medication dosage, use, side effects, and goals of treatment in detail.    Counseled patient on lifestyle modifications to help control hyperlipidemia.   Advised patient to exercise for 150 minutes weekly. (30 minute brisk walk, 5 days a week for example)  Weight Loss encouraged    Lab Results   Component Value Date    CHLPL 92 (L) 07/10/2023    CHLPL 102 03/01/2023    CHLPL 108 11/01/2022    TRIG 124 07/10/2023    TRIG 190 (H) 03/01/2023    TRIG 168 (H) 11/01/2022    HDL 27 (L) 07/10/2023    HDL 26 (L) 03/01/2023    HDL 27 (L) 11/01/2022    LDL 43 07/10/2023    LDL 45 03/01/2023    LDL 52 11/01/2022     Goal of therapy: LDL  mg/dL, to target      Followup in 6 months.

## 2025-05-29 NOTE — ASSESSMENT & PLAN NOTE
Hypertension is uncontrolled.  In the setting of CHF, uptitrate valsartan to 320 mg p.o. daily.  Continue current treatment regimen.  Dietary sodium restriction.  Weight loss.  Regular aerobic exercise.  Ambulatory blood pressure monitoring.  Blood pressure will be reassessedin 4 weeks.
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
## 2025-05-29 NOTE — PATIENT INSTRUCTIONS
[facility]  [facility] CARDIOLOGY  [address] [place] [address]  Dept: [phone]  Dept Fax: [phone]    Date:   Patient: Paul Shook    Blood Pressure Log  Goal blood Pressure <130/80          Provided By: Angelito Wilson MD    Date Blood Pressure Heart Rate Comments   Friday May 30, 2025       Saturday May 31, 2025       Gerry June 1, 2025       Monday June 2, 2025       Tuesday Alicia 3, 2025       Wednesday June 4, 2025       Thursday June 5, 2025       Friday June 6, 2025       Saturday June 7, 2025       Gerry June 8, 2025       Monday June 9, 2025       Tuesday Alicia 10, 2025       Wednesday June 11, 2025       Thursday June 12, 2025       Friday June 13, 2025       Saturday June 14, 2025       Gerry Alicia 15, 2025       Monday June 16, 2025       Tuesday June 17, 2025       Wednesday June 18, 2025       Thursday June 19, 2025       Friday June 20, 2025       Saturday June 21, 2025       Gerry June 22, 2025       Monday June 23, 2025       Tuesday June 24, 2025       Wednesday June 25, 2025       Thursday June 26, 2025       Friday June 27, 2025       Saturday June 28, 2025       Gerry June 29, 2025       Monday June 30, 2025       Tuesday July 1, 2025       Wednesday July 2, 2025       Thursday July 3, 2025       Friday July 4, 2025       Saturday July 5, 2025       Gerry July 6, 2025       Monday July 7, 2025       Tuesday July 8, 2025       Wednesday July 9, 2025       Thursday July 10, 2025       Friday July 11, 2025       Saturday July 12, 2025       Gerry July 13, 2025       Monday July 14, 2025       Tuesday July 15, 2025       Wednesday July 16, 2025       Thursday July 17, 2025       Friday July 18, 2025       Saturday July 19, 2025       Gerry July 20, 2025       Monday July 21, 2025       Tuesday July 22, 2025       Wednesday July 23, 2025       Thursday July 24, 2025       Friday July 25, 2025       Saturday July 26, 2025       Gerry July  27, 2025

## 2025-05-29 NOTE — ASSESSMENT & PLAN NOTE
Clinical congestive heart failure with volume overload and dyspnea on exertion.  NYHA III, class C.  Blood pressure and heart rate not to target.  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 Plan:  Patient getting a transthoracic echocardiogram next week at [facility]  Uptitrate valsartan to 320 mg p.o. daily  Uptitrate torsemide to 20 mg daily, target weight loss 10-15 pounds  Labs in 2 weeks, and follow-up in 4 weeks  We will communicate today's records to patient nephrologist and hematologist, as well as primary care provider

## 2025-06-12 ENCOUNTER — CLINICAL SUPPORT (OUTPATIENT)
Dept: CARDIOLOGY | Facility: CLINIC | Age: 80
End: 2025-06-12
Payer: MEDICARE

## 2025-06-12 DIAGNOSIS — I50.9 OTHER CONGESTIVE HEART FAILURE: ICD-10-CM

## 2025-06-12 PROCEDURE — 36415 COLL VENOUS BLD VENIPUNCTURE: CPT | Performed by: INTERNAL MEDICINE

## 2025-06-12 NOTE — PROGRESS NOTES
Venipuncture Blood Specimen Collection  Venipuncture performed in clinic by Sara Peterson RN with good hemostasis. Patient tolerated the procedure well without complications.   06/12/25   Sara Peterson RN

## 2025-06-13 LAB
ALBUMIN SERPL-MCNC: 4.3 G/DL (ref 3.8–4.8)
ALP SERPL-CCNC: 132 IU/L (ref 44–121)
ALT SERPL-CCNC: 11 IU/L (ref 0–44)
AST SERPL-CCNC: 14 IU/L (ref 0–40)
BASOPHILS # BLD AUTO: 0.1 X10E3/UL (ref 0–0.2)
BASOPHILS NFR BLD AUTO: 1 %
BILIRUB SERPL-MCNC: 0.3 MG/DL (ref 0–1.2)
BUN SERPL-MCNC: 28 MG/DL (ref 8–27)
BUN/CREAT SERPL: 14 (ref 10–24)
CALCIUM SERPL-MCNC: 9.7 MG/DL (ref 8.6–10.2)
CHLORIDE SERPL-SCNC: 94 MMOL/L (ref 96–106)
CO2 SERPL-SCNC: 22 MMOL/L (ref 20–29)
CREAT SERPL-MCNC: 2.06 MG/DL (ref 0.76–1.27)
EGFRCR SERPLBLD CKD-EPI 2021: 32 ML/MIN/1.73
EOSINOPHIL # BLD AUTO: 0.4 X10E3/UL (ref 0–0.4)
EOSINOPHIL NFR BLD AUTO: 4 %
ERYTHROCYTE [DISTWIDTH] IN BLOOD BY AUTOMATED COUNT: 14.4 % (ref 11.6–15.4)
GLOBULIN SER CALC-MCNC: 2.9 G/DL (ref 1.5–4.5)
GLUCOSE SERPL-MCNC: 208 MG/DL (ref 70–99)
HCT VFR BLD AUTO: 29.8 % (ref 37.5–51)
HGB BLD-MCNC: 9 G/DL (ref 13–17.7)
IMM GRANULOCYTES # BLD AUTO: 0.1 X10E3/UL (ref 0–0.1)
IMM GRANULOCYTES NFR BLD AUTO: 1 %
LYMPHOCYTES # BLD AUTO: 1.7 X10E3/UL (ref 0.7–3.1)
LYMPHOCYTES NFR BLD AUTO: 17 %
MAGNESIUM SERPL-MCNC: 2.2 MG/DL (ref 1.6–2.3)
MCH RBC QN AUTO: 29.5 PG (ref 26.6–33)
MCHC RBC AUTO-ENTMCNC: 30.2 G/DL (ref 31.5–35.7)
MCV RBC AUTO: 98 FL (ref 79–97)
MONOCYTES # BLD AUTO: 0.6 X10E3/UL (ref 0.1–0.9)
MONOCYTES NFR BLD AUTO: 6 %
NEUTROPHILS # BLD AUTO: 7.5 X10E3/UL (ref 1.4–7)
NEUTROPHILS NFR BLD AUTO: 71 %
NT-PROBNP SERPL-MCNC: 225 PG/ML (ref 0–486)
PLATELET # BLD AUTO: 322 X10E3/UL (ref 150–450)
POTASSIUM SERPL-SCNC: 4.3 MMOL/L (ref 3.5–5.2)
PROT SERPL-MCNC: 7.2 G/DL (ref 6–8.5)
RBC # BLD AUTO: 3.05 X10E6/UL (ref 4.14–5.8)
SODIUM SERPL-SCNC: 134 MMOL/L (ref 134–144)
WBC # BLD AUTO: 10.3 X10E3/UL (ref 3.4–10.8)

## 2025-06-15 ENCOUNTER — RESULTS FOLLOW-UP (OUTPATIENT)
Dept: CARDIOLOGY | Facility: CLINIC | Age: 80
End: 2025-06-15
Payer: MEDICARE

## 2025-06-15 DIAGNOSIS — D47.2 MONOCLONAL GAMMOPATHY OF UNKNOWN SIGNIFICANCE (MGUS): ICD-10-CM

## 2025-06-15 DIAGNOSIS — R89.9 ABNORMAL LABORATORY TEST: Primary | ICD-10-CM

## 2025-06-16 LAB
ALBUMIN SERPL ELPH-MCNC: 3.5 G/DL (ref 2.9–4.4)
ALBUMIN/GLOB SERPL: 1 {RATIO} (ref 0.7–1.7)
ALPHA1 GLOB SERPL ELPH-MCNC: 0.2 G/DL (ref 0–0.4)
ALPHA2 GLOB SERPL ELPH-MCNC: 1.1 G/DL (ref 0.4–1)
B-GLOBULIN SERPL ELPH-MCNC: 1.3 G/DL (ref 0.7–1.3)
GAMMA GLOB SERPL ELPH-MCNC: 1.2 G/DL (ref 0.4–1.8)
GLOBULIN SER-MCNC: 3.8 G/DL (ref 2.2–3.9)
IGA SERPL-MCNC: 375 MG/DL (ref 61–437)
IGG SERPL-MCNC: 1175 MG/DL (ref 603–1613)
IGM SERPL-MCNC: 172 MG/DL (ref 15–143)
INTERPRETATION SERPL IEP-IMP: ABNORMAL
KAPPA LC FREE SER-MCNC: 48 MG/L (ref 3.3–19.4)
KAPPA LC FREE/LAMBDA FREE SER: 0.81 {RATIO} (ref 0.26–1.65)
LABORATORY COMMENT REPORT: ABNORMAL
LAMBDA LC FREE SERPL-MCNC: 59 MG/L (ref 5.7–26.3)
M PROTEIN SERPL ELPH-MCNC: 0.2 G/DL
PROT SERPL-MCNC: 7.3 G/DL (ref 6–8.5)

## 2025-06-16 NOTE — TELEPHONE ENCOUNTER
Tried calling, kayla full.    HUB OKAY TO RELAY   Please inform patient that his Blood work was unchanged. Heart test/ CHF normal..   Thank you!

## 2025-06-17 NOTE — TELEPHONE ENCOUNTER
Patient notified of blood work results, patient verbalized understanding.   Patient is not current with a hematologist, offered referral to Dr. Knowles, patient verbalized agreement

## 2025-06-20 DIAGNOSIS — D47.2 MONOCLONAL GAMMOPATHY: Primary | ICD-10-CM

## 2025-06-23 ENCOUNTER — OFFICE VISIT (OUTPATIENT)
Dept: CARDIOLOGY | Facility: CLINIC | Age: 80
End: 2025-06-23
Payer: MEDICARE

## 2025-06-23 VITALS
WEIGHT: 242 LBS | SYSTOLIC BLOOD PRESSURE: 142 MMHG | OXYGEN SATURATION: 99 % | HEIGHT: 66 IN | BODY MASS INDEX: 38.89 KG/M2 | HEART RATE: 70 BPM | RESPIRATION RATE: 18 BRPM | DIASTOLIC BLOOD PRESSURE: 86 MMHG

## 2025-06-23 DIAGNOSIS — E78.2 MIXED HYPERLIPIDEMIA: ICD-10-CM

## 2025-06-23 DIAGNOSIS — I10 HYPERTENSION, ESSENTIAL: ICD-10-CM

## 2025-06-23 DIAGNOSIS — I77.810 MILD ASCENDING AORTA DILATATION: ICD-10-CM

## 2025-06-23 DIAGNOSIS — I50.9 OTHER CONGESTIVE HEART FAILURE: Primary | ICD-10-CM

## 2025-06-23 PROCEDURE — G2211 COMPLEX E/M VISIT ADD ON: HCPCS | Performed by: INTERNAL MEDICINE

## 2025-06-23 PROCEDURE — 1160F RVW MEDS BY RX/DR IN RCRD: CPT | Performed by: INTERNAL MEDICINE

## 2025-06-23 PROCEDURE — 3077F SYST BP >= 140 MM HG: CPT | Performed by: INTERNAL MEDICINE

## 2025-06-23 PROCEDURE — 3079F DIAST BP 80-89 MM HG: CPT | Performed by: INTERNAL MEDICINE

## 2025-06-23 PROCEDURE — 1159F MED LIST DOCD IN RCRD: CPT | Performed by: INTERNAL MEDICINE

## 2025-06-23 PROCEDURE — 99214 OFFICE O/P EST MOD 30 MIN: CPT | Performed by: INTERNAL MEDICINE

## 2025-06-23 RX ORDER — TORSEMIDE 20 MG/1
20 TABLET ORAL DAILY
COMMUNITY
End: 2025-06-24 | Stop reason: SDUPTHER

## 2025-06-23 RX ORDER — DAPAGLIFLOZIN 10 MG/1
10 TABLET, FILM COATED ORAL DAILY
Qty: 90 TABLET | Refills: 3 | Status: SHIPPED | OUTPATIENT
Start: 2025-06-23

## 2025-06-23 NOTE — ASSESSMENT & PLAN NOTE
On transthoracic echocardiogram measuring 3.8 cm at the root and 3.7 cm at the level of the ascending aorta.  Aggressive blood pressure control with amlodipine-valsartan.  No room heart rate for beta-blocker therapy.  Continue Repatha for lipid-lowering.  Follow-up TTE in 2 - 3 years

## 2025-06-23 NOTE — ASSESSMENT & PLAN NOTE
Hypertension is uncontrolled.  Start Farxiga 10 mg p.o. daily for CHF.  Reinforced low-salt diet and adjust torsemide dose as per CHF.  Continue current treatment regimen.  Dietary sodium restriction.  Weight loss.  Regular aerobic exercise.  Ambulatory blood pressure monitoring.  Blood pressure will be reassessedin 3 months.

## 2025-06-23 NOTE — ASSESSMENT & PLAN NOTE
Clinical congestive heart failure with volume overload and dyspnea on exertion.  NYHA IIb, class C.  Blood pressure and heart rate not to target.  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  Paul transthoracic echocardiogram showing mild LVH, normal EF, grade 1 diastolic dysfunction, mild aortic sclerosis, mild aortic root and ascending aorta dilatation.  Plan:  Continue valsartan to 320 mg p.o. 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

## 2025-06-23 NOTE — PROGRESS NOTES
[facility] [place]  [facility] CARDIOLOGY  [address] [place] [address]  Dept: [phone]  Dept Fax: [phone]    Date: 06/23/2025  Patient: Paul Shook  YOB: 1945    Follow Up Office Visit Note    Interval Follow-up  Mr. Paul Shook is a 79 y.o. male who is here for follow-up on Hypertension and Leg Swelling.    Subjective   Patient doing well with no acute complaints.  Shortness of breath on mild intensity activities, somewhat worse from prior, with worsening swelling.  Patient denies angina, orthopnea, PND, palpitations, lightheadedness, syncope or medications side-effects.    The following portions of the patient's history were reviewed and updated as appropriate: allergies, current medications, past family history, past medical history, past social history, past surgical history, and problem list.    Medications:   Allergies   Allergen Reactions    Lisinopril Cough      Current Outpatient Medications   Medication Instructions    acetaminophen (TYLENOL) 650 mg, Oral, 2 Times Daily    amLODIPine 5 MG tablet 5 mg, valsartan 160 MG tablet 160 mg 5-160 doses, Oral, Daily    APPLE CIDER VINEGAR PO 1 tablet, Oral, 2 Times Daily, 480MG    aspirin 81 mg, Oral, Daily    dapagliflozin Propanediol (FARXIGA) 10 mg, Oral, Daily    docusate sodium (STOOL SOFTENER) 100 mg, Oral, 2 Times Daily    glipiZIDE 2.5 mg, Oral, Daily    glucose blood test strip 1 each, Other, Daily, Use as instructed    frestyle lite strips     ketorolac (ACULAR) 0.5 % ophthalmic solution 1 drop, Both Eyes, Daily    Potassium 99 MG tablet 1 tablet, Oral, 3 Times Weekly, MON/ WED/ FRIDAY    Repatha SureClick 140 mg, Subcutaneous, Every 14 Days    tamsulosin (FLOMAX) 0.4 MG capsule 24 hr capsule 1 capsule, Nightly    torsemide (DEMADEX) 20 mg, Oral, Daily    vitamin B-12 (CYANOCOBALAMIN) 1,000 mcg, Oral, Nightly       Tobacco Use: Low Risk  (6/23/2025)    Patient History     Smoking  "Tobacco Use: Never     Smokeless Tobacco Use: Never     Passive Exposure: Never        Objective  Vitals:    06/23/25 1006   BP: 142/86   Pulse: 70   Resp: 18   SpO2: 99%   Weight: 110 kg (242 lb)  Comment: per pt, unable to stand on scale   Height: 167.6 cm (66\")   PainSc: 0-No pain      Vitals:    06/23/25 1006   BP: 142/86   Pulse: 70   Resp: 18   SpO2: 99%   Weight: 110 kg (242 lb)   Height: 167.6 cm (66\")          Physical Exam  Constitutional:       Appearance: Healthy appearance. Not in distress.   Eyes:      Pupils: Pupils are equal, round, and reactive to light.   Neck:      Vascular: No JVR. JVD normal.   Pulmonary:      Effort: Pulmonary effort is normal.      Breath sounds: Normal breath sounds. No wheezing. No rhonchi. No rales.      Comments: Faint crackles at bases  Chest:      Chest wall: Not tender to palpatation.   Cardiovascular:      PMI at left midclavicular line. Normal rate. Regular rhythm. Normal S1 with normal intensity. Normal S2 with normal intensity.       Murmurs: There is no murmur.      No gallop.  No click. No rub.      Comments: 1+ bilateral lower extremities edema, partially-pitting, poor circulation  Pulses:     Intact distal pulses.   Edema:     Pretibial: bilateral 1+ edema of the pretibial area.     Ankle: bilateral 1+ edema of the ankle.     Feet: bilateral 1+ edema of the feet.  Abdominal:      General: There is no abdominal bruit.   Skin:     General: Skin is warm.   Neurological:      Mental Status: Alert and oriented to person, place and time.              Diagnostic Data  Lab Results   Component Value Date     06/12/2025    K 4.3 06/12/2025    CL 94 (L) 06/12/2025    CO2 22 06/12/2025    MG 2.2 06/12/2025    BUN 28 (H) 06/12/2025    CREATININE 2.06 (H) 06/12/2025    CALCIUM 9.7 06/12/2025    BILITOT 0.3 06/12/2025    ALKPHOS 132 (H) 06/12/2025    ALT 11 06/12/2025    AST 14 06/12/2025    GLUCOSE 208 (H) 06/12/2025    ALBUMIN 3.5 06/12/2025    ALBUMIN 4.3 06/12/2025 " "    Lab Results   Component Value Date    WBC 10.3 06/12/2025    HGB 9.0 (L) 06/12/2025    HCT 29.8 (L) 06/12/2025     06/12/2025     No results found for: \"APTT\", \"INR\", \"PTT\"  No results found for: \"CKTOTAL\", \"TROPONINI\", \"TROPONINT\", \"CKMBINDEX\", \"BNP\"  Lab Results   Component Value Date    PROBNP 225 06/12/2025       Lab Results   Component Value Date    CHLPL 92 (L) 07/10/2023    TRIG 124 07/10/2023    HDL 27 (L) 07/10/2023    LDL 43 07/10/2023     Lab Results   Component Value Date    TSH 1.930 11/01/2022    FREET4 1.49 11/01/2022       CV Diagnostics:  Procedures  CXR: No results found for this or any previous visit.     ECHO/MUGA: No results found for this or any previous visit.     STRESS TESTS: No results found for this or any previous visit.     CARDIAC CATH: No results found for this or any previous visit.     DEVICES: No valid procedures specified.   HOLTER: No results found for this or any previous visit.     CT/MRI:  No results found for this or any previous visit.    VASCULAR: No valid procedures specified.     Assessment and Plan  Diagnoses and all orders for this visit:    1. Other congestive heart failure (Primary)  Assessment & Plan:  Clinical congestive heart failure with volume overload and dyspnea on exertion.  NYHA IIb, class C.  Blood pressure and heart rate not to target.  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  Paul transthoracic echocardiogram showing mild LVH, normal EF, grade 1 diastolic dysfunction, mild aortic sclerosis, mild aortic root and ascending aorta dilatation.  Plan:  Continue valsartan to 320 mg p.o. 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 Hypertension, essential  Assessment & Plan:  Hypertension is uncontrolled.  Start Farxiga 10 mg p.o. daily for CHF.  Reinforced low-salt diet and adjust torsemide dose as per CHF.  Continue current treatment regimen.  Dietary sodium restriction.  Weight loss.  Regular aerobic exercise.  Ambulatory blood pressure monitoring.  Blood pressure will be reassessedin 3 months.      3. Mixed hyperlipidemia  Assessment & Plan:   Lipid abnormalities are improving with treatment    Plan:  Continue same medication/s without change.  Repatha 140 mg subcutaneously every 2 weeks    Discussed medication dosage, use, side effects, and goals of treatment in detail.    Counseled patient on lifestyle modifications to help control hyperlipidemia.   Advised patient to exercise for 150 minutes weekly. (30 minute brisk walk, 5 days a week for example)  Weight Loss encouraged    Lab Results   Component Value Date    CHLPL 92 (L) 07/10/2023    CHLPL 102 03/01/2023    CHLPL 108 11/01/2022    TRIG 124 07/10/2023    TRIG 190 (H) 03/01/2023    TRIG 168 (H) 11/01/2022    HDL 27 (L) 07/10/2023    HDL 26 (L) 03/01/2023    HDL 27 (L) 11/01/2022    LDL 43 07/10/2023    LDL 45 03/01/2023    LDL 52 11/01/2022     Goal of therapy: LDL  mg/dL, to target      Followup in 6 months.      4. Mild ascending aorta dilatation  Assessment & Plan:  On transthoracic echocardiogram measuring 3.8 cm at the root and 3.7 cm at the level of the ascending aorta.  Aggressive blood pressure control with amlodipine-valsartan.  No room heart rate for beta-blocker therapy.  Continue Repatha for lipid-lowering.  Follow-up TTE in 2 - 3 years      Other orders  -     dapagliflozin Propanediol (Farxiga) 10 MG tablet; Take 10 mg by mouth Daily.  Dispense: 90 tablet; Refill: 3         Return in about 3 months (around 9/23/2025) for Follow-up with Dr Wilson.    There are no Patient Instructions on file for this  visit.    Angelito Wilson MD

## 2025-06-24 ENCOUNTER — TELEPHONE (OUTPATIENT)
Dept: CARDIOLOGY | Facility: CLINIC | Age: 80
End: 2025-06-24
Payer: MEDICARE

## 2025-06-24 RX ORDER — TORSEMIDE 20 MG/1
20 TABLET ORAL DAILY
Qty: 90 TABLET | Refills: 1 | Status: SHIPPED | OUTPATIENT
Start: 2025-06-24 | End: 2025-06-24 | Stop reason: SDUPTHER

## 2025-06-24 RX ORDER — TORSEMIDE 20 MG/1
20 TABLET ORAL DAILY
Qty: 90 TABLET | Refills: 1 | Status: SHIPPED | OUTPATIENT
Start: 2025-06-24

## 2025-06-24 NOTE — TELEPHONE ENCOUNTER
Pt was was originally only taking Torsemide 3 times a week but Katie increased it to once daily. Pt's niece stated they are almost out and pharmacy won't refill since he's technically not due for a refill based off of the previous rx. They are requesting we [facility] [place].

## 2025-07-01 ENCOUNTER — TELEPHONE (OUTPATIENT)
Dept: ONCOLOGY | Facility: CLINIC | Age: 80
End: 2025-07-01
Payer: MEDICARE

## 2025-07-01 NOTE — TELEPHONE ENCOUNTER
Caller: JOELLEN LUEVANO    Relationship: Emergency Contact    Best call back number: [phone]    What is the best time to reach you: ANY    Who are you requesting to speak with (clinical staff, provider,  specific staff member): CLINICAL     What was the call regarding: JOELLEN IS CALLING STATES THAT CLINTON IS SUPPOSE TO GO TO [facility] FOR LABS AND URINE  SHE IS WANTING TO KNOW IF ORDERS HAVE BEEN SENT TO [facility]    PLEASE CALL JOELLEN

## 2025-07-01 NOTE — TELEPHONE ENCOUNTER
Called Sara back and informed her that lab orders were faxed on 6/20/25 but were refaxed again today. She verbalized understanding.

## 2025-07-23 LAB
ALBUMIN SERPL ELPH-MCNC: 3.6 G/DL (ref 2.9–4.4)
ALBUMIN SERPL-MCNC: 4.2 G/DL (ref 3.8–4.8)
ALBUMIN/GLOB SERPL: 1 {RATIO} (ref 0.7–1.7)
ALP SERPL-CCNC: 132 IU/L (ref 44–121)
ALPHA1 GLOB SERPL ELPH-MCNC: 0.2 G/DL (ref 0–0.4)
ALPHA2 GLOB SERPL ELPH-MCNC: 1.2 G/DL (ref 0.4–1)
ALT SERPL-CCNC: 8 IU/L (ref 0–44)
AMBIG ABBREV CMP14 DEFAULT: NORMAL
AST SERPL-CCNC: 14 IU/L (ref 0–40)
B-GLOBULIN SERPL ELPH-MCNC: 1.3 G/DL (ref 0.7–1.3)
BILIRUB SERPL-MCNC: 0.3 MG/DL (ref 0–1.2)
BUN SERPL-MCNC: 29 MG/DL (ref 8–27)
BUN/CREAT SERPL: 14 (ref 10–24)
CALCIUM SERPL-MCNC: 9.5 MG/DL (ref 8.6–10.2)
CHLORIDE SERPL-SCNC: 94 MMOL/L (ref 96–106)
CO2 SERPL-SCNC: 20 MMOL/L (ref 20–29)
CREAT SERPL-MCNC: 2.1 MG/DL (ref 0.76–1.27)
CRP SERPL-MCNC: 41 MG/L (ref 0–10)
EGFRCR SERPLBLD CKD-EPI 2021: 31 ML/MIN/1.73
ERYTHROCYTE [SEDIMENTATION RATE] IN BLOOD BY WESTERGREN METHOD: 74 MM/HR (ref 0–30)
GAMMA GLOB SERPL ELPH-MCNC: 1.2 G/DL (ref 0.4–1.8)
GLOBULIN SER CALC-MCNC: 3.4 G/DL (ref 1.5–4.5)
GLOBULIN SER-MCNC: 4 G/DL (ref 2.2–3.9)
GLUCOSE SERPL-MCNC: 265 MG/DL (ref 70–99)
IGA SERPL-MCNC: 381 MG/DL (ref 61–437)
IGG SERPL-MCNC: 1131 MG/DL (ref 603–1613)
IGM SERPL-MCNC: 179 MG/DL (ref 15–143)
INTERPRETATION SERPL IEP-IMP: ABNORMAL
KAPPA LC FREE SER-MCNC: 54.8 MG/L (ref 3.3–19.4)
KAPPA LC FREE/LAMBDA FREE SER: 0.87 {RATIO} (ref 0.26–1.65)
LABORATORY COMMENT REPORT: ABNORMAL
LAMBDA LC FREE SERPL-MCNC: 63.2 MG/L (ref 5.7–26.3)
M PROTEIN SERPL ELPH-MCNC: ABNORMAL G/DL
POTASSIUM SERPL-SCNC: 3.7 MMOL/L (ref 3.5–5.2)
PROT SERPL-MCNC: 7.6 G/DL (ref 6–8.5)
SODIUM SERPL-SCNC: 136 MMOL/L (ref 134–144)

## 2025-08-01 ENCOUNTER — CONSULT (OUTPATIENT)
Dept: ONCOLOGY | Facility: CLINIC | Age: 80
End: 2025-08-01
Payer: MEDICARE

## 2025-08-01 ENCOUNTER — LAB (OUTPATIENT)
Dept: LAB | Facility: HOSPITAL | Age: 80
End: 2025-08-01
Payer: MEDICARE

## 2025-08-01 VITALS
SYSTOLIC BLOOD PRESSURE: 135 MMHG | RESPIRATION RATE: 16 BRPM | OXYGEN SATURATION: 97 % | DIASTOLIC BLOOD PRESSURE: 74 MMHG | HEART RATE: 72 BPM | WEIGHT: 230 LBS | TEMPERATURE: 97.7 F | HEIGHT: 66 IN | BODY MASS INDEX: 36.96 KG/M2

## 2025-08-01 DIAGNOSIS — Z13.6 ENCOUNTER FOR SCREENING FOR CARDIOVASCULAR DISORDERS: ICD-10-CM

## 2025-08-01 DIAGNOSIS — D47.2 MONOCLONAL GAMMOPATHY: ICD-10-CM

## 2025-08-01 DIAGNOSIS — D64.9 ANEMIA, UNSPECIFIED TYPE: ICD-10-CM

## 2025-08-01 DIAGNOSIS — D47.2 MONOCLONAL GAMMOPATHY: Primary | ICD-10-CM

## 2025-08-01 LAB
ALBUMIN SERPL-MCNC: 4.1 G/DL (ref 3.5–5.2)
ALBUMIN/GLOB SERPL: 1.1 G/DL
ALP SERPL-CCNC: 137 U/L (ref 39–117)
ALT SERPL W P-5'-P-CCNC: 10 U/L (ref 1–41)
ANION GAP SERPL CALCULATED.3IONS-SCNC: 14.1 MMOL/L (ref 5–15)
AST SERPL-CCNC: 13 U/L (ref 1–40)
BASOPHILS # BLD AUTO: 0.05 10*3/MM3 (ref 0–0.2)
BASOPHILS NFR BLD AUTO: 0.5 % (ref 0–1.5)
BILIRUB CONJ SERPL-MCNC: 0.1 MG/DL (ref 0–0.3)
BILIRUB SERPL-MCNC: 0.3 MG/DL (ref 0–1.2)
BUN SERPL-MCNC: 19.5 MG/DL (ref 8–23)
BUN/CREAT SERPL: 9.4 (ref 7–25)
CALCIUM SPEC-SCNC: 10.1 MG/DL (ref 8.6–10.5)
CHLORIDE SERPL-SCNC: 93 MMOL/L (ref 98–107)
CO2 SERPL-SCNC: 26.9 MMOL/L (ref 22–29)
CREAT SERPL-MCNC: 2.07 MG/DL (ref 0.76–1.27)
CRP SERPL-MCNC: 4.33 MG/DL (ref 0–0.5)
DAT POLY-SP REAG RBC QL: NEGATIVE
DEPRECATED RDW RBC AUTO: 45.6 FL (ref 37–54)
EGFRCR SERPLBLD CKD-EPI 2021: 32 ML/MIN/1.73
EOSINOPHIL # BLD AUTO: 0.33 10*3/MM3 (ref 0–0.4)
EOSINOPHIL NFR BLD AUTO: 3.2 % (ref 0.3–6.2)
ERYTHROCYTE [DISTWIDTH] IN BLOOD BY AUTOMATED COUNT: 14.2 % (ref 12.3–15.4)
ERYTHROCYTE [SEDIMENTATION RATE] IN BLOOD: 66 MM/HR (ref 0–20)
FERRITIN SERPL-MCNC: 83.5 NG/ML (ref 30–400)
FOLATE SERPL-MCNC: 5.4 NG/ML (ref 4.78–24.2)
GLOBULIN UR ELPH-MCNC: 3.6 GM/DL
GLUCOSE SERPL-MCNC: 296 MG/DL (ref 65–99)
HCT VFR BLD AUTO: 28.5 % (ref 37.5–51)
HCYS SERPL-MCNC: 16.9 UMOL/L (ref 0–15)
HGB BLD-MCNC: 9.3 G/DL (ref 13–17.7)
IMM GRANULOCYTES # BLD AUTO: 0.15 10*3/MM3 (ref 0–0.05)
IMM GRANULOCYTES NFR BLD AUTO: 1.4 % (ref 0–0.5)
IRON 24H UR-MRATE: 29 MCG/DL (ref 59–158)
IRON SATN MFR SERPL: 8 % (ref 20–50)
LYMPHOCYTES # BLD AUTO: 1.47 10*3/MM3 (ref 0.7–3.1)
LYMPHOCYTES NFR BLD AUTO: 14.1 % (ref 19.6–45.3)
MCH RBC QN AUTO: 28.9 PG (ref 26.6–33)
MCHC RBC AUTO-ENTMCNC: 32.6 G/DL (ref 31.5–35.7)
MCV RBC AUTO: 88.5 FL (ref 79–97)
MONOCYTES # BLD AUTO: 0.57 10*3/MM3 (ref 0.1–0.9)
MONOCYTES NFR BLD AUTO: 5.5 % (ref 5–12)
NEUTROPHILS NFR BLD AUTO: 7.87 10*3/MM3 (ref 1.7–7)
NEUTROPHILS NFR BLD AUTO: 75.3 % (ref 42.7–76)
NRBC BLD AUTO-RTO: 0 /100 WBC (ref 0–0.2)
PLATELET # BLD AUTO: 328 10*3/MM3 (ref 140–450)
PMV BLD AUTO: 11.1 FL (ref 6–12)
POTASSIUM SERPL-SCNC: 3.2 MMOL/L (ref 3.5–5.2)
PROT SERPL-MCNC: 7.7 G/DL (ref 6–8.5)
RBC # BLD AUTO: 3.22 10*6/MM3 (ref 4.14–5.8)
RETICS # AUTO: 0.08 10*6/MM3 (ref 0.02–0.13)
RETICS/RBC NFR AUTO: 2.39 % (ref 0.7–1.9)
SODIUM SERPL-SCNC: 134 MMOL/L (ref 136–145)
TIBC SERPL-MCNC: 341 MCG/DL (ref 298–536)
TRANSFERRIN SERPL-MCNC: 229 MG/DL (ref 200–360)
VIT B12 BLD-MCNC: >2000 PG/ML (ref 211–946)
WBC NRBC COR # BLD AUTO: 10.44 10*3/MM3 (ref 3.4–10.8)

## 2025-08-01 PROCEDURE — 80053 COMPREHEN METABOLIC PANEL: CPT

## 2025-08-01 PROCEDURE — 82784 ASSAY IGA/IGD/IGG/IGM EACH: CPT

## 2025-08-01 PROCEDURE — 83540 ASSAY OF IRON: CPT

## 2025-08-01 PROCEDURE — 83521 IG LIGHT CHAINS FREE EACH: CPT

## 2025-08-01 PROCEDURE — 36415 COLL VENOUS BLD VENIPUNCTURE: CPT

## 2025-08-01 PROCEDURE — 86880 COOMBS TEST DIRECT: CPT

## 2025-08-01 PROCEDURE — 82746 ASSAY OF FOLIC ACID SERUM: CPT

## 2025-08-01 PROCEDURE — 86334 IMMUNOFIX E-PHORESIS SERUM: CPT

## 2025-08-01 PROCEDURE — 82607 VITAMIN B-12: CPT

## 2025-08-01 PROCEDURE — 82668 ASSAY OF ERYTHROPOIETIN: CPT

## 2025-08-01 PROCEDURE — 86140 C-REACTIVE PROTEIN: CPT

## 2025-08-01 PROCEDURE — 85652 RBC SED RATE AUTOMATED: CPT

## 2025-08-01 PROCEDURE — 83921 ORGANIC ACID SINGLE QUANT: CPT

## 2025-08-01 PROCEDURE — 83010 ASSAY OF HAPTOGLOBIN QUANT: CPT

## 2025-08-01 PROCEDURE — 84466 ASSAY OF TRANSFERRIN: CPT

## 2025-08-01 PROCEDURE — 82785 ASSAY OF IGE: CPT

## 2025-08-01 PROCEDURE — 85045 AUTOMATED RETICULOCYTE COUNT: CPT

## 2025-08-01 PROCEDURE — 83090 ASSAY OF HOMOCYSTEINE: CPT

## 2025-08-01 PROCEDURE — 82728 ASSAY OF FERRITIN: CPT

## 2025-08-01 PROCEDURE — 82248 BILIRUBIN DIRECT: CPT

## 2025-08-01 PROCEDURE — 85025 COMPLETE CBC W/AUTO DIFF WBC: CPT

## 2025-08-01 PROCEDURE — 84165 PROTEIN E-PHORESIS SERUM: CPT
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
## 2025-08-03 LAB — HAPTOGLOB SERPL-MCNC: 378 MG/DL (ref 34–355)

## 2025-08-04 LAB — EPO SERPL-ACNC: 36.9 MIU/ML (ref 2.6–18.5)

## 2025-08-05 LAB
ALBUMIN SERPL ELPH-MCNC: 3.5 G/DL (ref 2.9–4.4)
ALBUMIN/GLOB SERPL: 1 {RATIO} (ref 0.7–1.7)
ALPHA1 GLOB SERPL ELPH-MCNC: 0.2 G/DL (ref 0–0.4)
ALPHA2 GLOB SERPL ELPH-MCNC: 1.1 G/DL (ref 0.4–1)
B-GLOBULIN SERPL ELPH-MCNC: 1.2 G/DL (ref 0.7–1.3)
GAMMA GLOB SERPL ELPH-MCNC: 1.1 G/DL (ref 0.4–1.8)
GLOBULIN SER-MCNC: 3.7 G/DL (ref 2.2–3.9)
IGA SERPL-MCNC: 372 MG/DL (ref 61–437)
IGG SERPL-MCNC: 1152 MG/DL (ref 603–1613)
IGM SERPL-MCNC: 178 MG/DL (ref 15–143)
INTERPRETATION SERPL IEP-IMP: ABNORMAL
KAPPA LC FREE SER-MCNC: 59.5 MG/L (ref 3.3–19.4)
KAPPA LC FREE/LAMBDA FREE SER: 0.89 {RATIO} (ref 0.26–1.65)
LABORATORY COMMENT REPORT: ABNORMAL
LAMBDA LC FREE SERPL-MCNC: 66.7 MG/L (ref 5.7–26.3)
M PROTEIN SERPL ELPH-MCNC: 0.2 G/DL
METHYLMALONATE SERPL-SCNC: 309 NMOL/L (ref 0–378)
PROT SERPL-MCNC: 7.2 G/DL (ref 6–8.5)

## 2025-08-06 LAB — IGE SERPL-ACNC: 23 IU/ML (ref 6–495)

## 2025-08-21 ENCOUNTER — TELEPHONE (OUTPATIENT)
Dept: CARDIOLOGY | Facility: CLINIC | Age: 80
End: 2025-08-21
Payer: MEDICARE

## 2025-08-22 ENCOUNTER — TELEPHONE (OUTPATIENT)
Dept: CARDIOLOGY | Facility: CLINIC | Age: 80
End: 2025-08-22
Payer: MEDICARE

## 2025-08-22 DIAGNOSIS — I10 HYPERTENSION, ESSENTIAL: Primary | Chronic | ICD-10-CM

## 2025-08-25 ENCOUNTER — TELEPHONE (OUTPATIENT)
Dept: INFUSION THERAPY | Facility: HOSPITAL | Age: 80
End: 2025-08-25
Payer: MEDICARE

## 2025-08-25 RX ORDER — AMLODIPINE AND VALSARTAN 5; 320 MG/1; MG/1
1 TABLET ORAL DAILY
Qty: 90 TABLET | Refills: 1 | Status: SHIPPED | OUTPATIENT
Start: 2025-08-25 | End: 2026-08-25

## (undated) DEVICE — GLV SURG BIOGEL LTX PF 7 1/2

## (undated) DEVICE — SYR LUERLOK 30CC

## (undated) DEVICE — CATH FOL BARDEX 2WY 24F 30CC

## (undated) DEVICE — CUTTING LOOP, BIPOLAR, 24/26 FR.: Brand: N.A.

## (undated) DEVICE — ST PRIM GRVTY NDLESS 3 INJ PORT 105IN

## (undated) DEVICE — SYR LL TP 10ML STRL

## (undated) DEVICE — BLANKT WARM UPPR/BDY ARM/OUT 57X196CM

## (undated) DEVICE — POUCH TYVK 3.5" X 9" SELF SEAL: Brand: MEDLINE

## (undated) DEVICE — PK CYSTO-TUR BASIC 10